# Patient Record
Sex: FEMALE | Race: WHITE | NOT HISPANIC OR LATINO | Employment: OTHER | URBAN - METROPOLITAN AREA
[De-identification: names, ages, dates, MRNs, and addresses within clinical notes are randomized per-mention and may not be internally consistent; named-entity substitution may affect disease eponyms.]

---

## 2017-09-14 ENCOUNTER — OFFICE VISIT (OUTPATIENT)
Dept: MEDICAL GROUP | Facility: MEDICAL CENTER | Age: 77
End: 2017-09-14
Payer: MEDICARE

## 2017-09-14 VITALS
RESPIRATION RATE: 14 BRPM | WEIGHT: 151.01 LBS | TEMPERATURE: 98.1 F | HEART RATE: 89 BPM | OXYGEN SATURATION: 92 % | BODY MASS INDEX: 28.51 KG/M2 | SYSTOLIC BLOOD PRESSURE: 120 MMHG | HEIGHT: 61 IN | DIASTOLIC BLOOD PRESSURE: 78 MMHG

## 2017-09-14 DIAGNOSIS — I25.10 CORONARY ARTERY DISEASE INVOLVING NATIVE CORONARY ARTERY OF NATIVE HEART WITHOUT ANGINA PECTORIS: ICD-10-CM

## 2017-09-14 DIAGNOSIS — E89.0 POSTABLATIVE HYPOTHYROIDISM: ICD-10-CM

## 2017-09-14 DIAGNOSIS — Z23 NEEDS FLU SHOT: ICD-10-CM

## 2017-09-14 DIAGNOSIS — I10 ESSENTIAL HYPERTENSION: ICD-10-CM

## 2017-09-14 DIAGNOSIS — F17.200 TOBACCO USE DISORDER, MILD, ABUSE: ICD-10-CM

## 2017-09-14 DIAGNOSIS — E78.00 HYPERCHOLESTEROLEMIA: ICD-10-CM

## 2017-09-14 DIAGNOSIS — R73.03 PREDIABETES: ICD-10-CM

## 2017-09-14 DIAGNOSIS — Z12.11 SCREEN FOR COLON CANCER: ICD-10-CM

## 2017-09-14 PROCEDURE — 99204 OFFICE O/P NEW MOD 45 MIN: CPT | Mod: 25 | Performed by: FAMILY MEDICINE

## 2017-09-14 PROCEDURE — G0008 ADMIN INFLUENZA VIRUS VAC: HCPCS | Performed by: FAMILY MEDICINE

## 2017-09-14 PROCEDURE — 90662 IIV NO PRSV INCREASED AG IM: CPT | Performed by: FAMILY MEDICINE

## 2017-09-14 RX ORDER — HYDROCHLOROTHIAZIDE 25 MG/1
TABLET ORAL
COMMUNITY
Start: 2017-06-26 | End: 2018-02-23 | Stop reason: SDUPTHER

## 2017-09-14 RX ORDER — ATORVASTATIN CALCIUM 80 MG/1
TABLET, FILM COATED ORAL
COMMUNITY
Start: 2017-08-15 | End: 2018-02-23 | Stop reason: SDUPTHER

## 2017-09-14 RX ORDER — LEVOTHYROXINE SODIUM 100 MCG
TABLET ORAL
COMMUNITY
Start: 2017-06-26 | End: 2018-02-23 | Stop reason: SDUPTHER

## 2017-09-14 RX ORDER — LISINOPRIL 10 MG/1
TABLET ORAL
COMMUNITY
Start: 2017-06-26 | End: 2018-02-23 | Stop reason: SDUPTHER

## 2017-09-14 RX ORDER — ASPIRIN 81 MG/1
81 TABLET, CHEWABLE ORAL DAILY
COMMUNITY

## 2017-09-14 ASSESSMENT — PATIENT HEALTH QUESTIONNAIRE - PHQ9: CLINICAL INTERPRETATION OF PHQ2 SCORE: 0

## 2017-09-14 NOTE — ASSESSMENT & PLAN NOTE
This is a chronic health problem that is well controlled with current medications and lifestyle measures. She has no complaints.  She recently lost 30 lbs due to efforts of her own.

## 2017-09-14 NOTE — ASSESSMENT & PLAN NOTE
This is a chronic health problem that is well controlled with current medications and lifestyle measures. The patient denies chest pain, shortness of breath or dyspnea on exertion.

## 2017-09-14 NOTE — ASSESSMENT & PLAN NOTE
This is a chronic health problem that is well controlled with current medications and lifestyle measures.

## 2017-09-14 NOTE — ASSESSMENT & PLAN NOTE
This is a chronic health problem that is well controlled with current medications and lifestyle measures.  Hasn't had recent blood work.  We will recheck and see her back to go over results.

## 2017-09-14 NOTE — LETTER
[facility]  Ryanne Leos M.D.  [address] [place] [address]  Fax: [phone]   Authorization for Release/Disclosure of   Protected Health Information   Name: KALANI ALMEIDA : 1940 SSN: [id number]   Address:  [address]  [place] [address] Phone:    [phone] (home)    I authorize the entity listed below to release/disclose the PHI below to:   [facility]/Ryanne Leos M.D. and Ryanne Leos M.D.   Provider or Entity Name:  Carlos Carlson MD     Address   City, [facility], [place] [address]   Phone:  [phone]       Fax:     Reason for request: continuity of care   Information to be released:    [  ] LAST COLONOSCOPY,  including any PATH REPORT and follow-up  [  ] LAST FIT/COLOGUARD RESULT [  ] LAST DEXA  [  ] LAST MAMMOGRAM  [  ] LAST PAP  [  ] LAST LABS [  ] RETINA EXAM REPORT  [  ] IMMUNIZATION RECORDS  [ X ] Release all info      [  ] Check here and initial the line next to each item to release ALL health information INCLUDING  _____ Care and treatment for drug and / or alcohol abuse  _____ HIV testing, infection status, or AIDS  _____ Genetic Testing    DATES OF SERVICE OR TIME PERIOD TO BE DISCLOSED: _____________  I understand and acknowledge that:  * This Authorization may be revoked at any time by you in writing, except if your health information has already been used or disclosed.  * Your health information that will be used or disclosed as a result of you signing this authorization could be re-disclosed by the recipient. If this occurs, your re-disclosed health information may no longer be protected by State or Federal laws.  * You may refuse to sign this Authorization. Your refusal will not affect your ability to obtain treatment.  * This Authorization becomes effective upon signing and will  on (date) __________.      If no date is indicated, this Authorization will  one  (1) year from the signature date.    Name: Pam Adame    Signature:   Date:     9/14/2017       PLEASE FAX REQUESTED RECORDS BACK TO: [phone]

## 2017-09-14 NOTE — ASSESSMENT & PLAN NOTE
This is a chronic health problem that is uncontrolled with current lifestyle measures.  This patient smoked a half pack of cigarettes per day from age 24 age 51 or 52. She then quit for nearly 24 years but in the last year her stressors have increased with her  being ill and she smoking about a quarter of a pack per day. She does it when she is out of the home. He does not know that she is smoking.

## 2017-09-14 NOTE — ASSESSMENT & PLAN NOTE
This is a chronic health problem that is well controlled with current medications and lifestyle measures.  She is physically active without chest pain, shortness of breath or dyspnea on exertion. She has done well since her surgery. She does not regularly see a cardiologist. She takes her medications as prescribed.

## 2017-09-14 NOTE — PROGRESS NOTES
CC: To establish care and discuss her chronic health problems listed below.    HISTORY OF THE PRESENT ILLNESS: Patient is a 76 y.o. female. This pleasant patient is here today to establish care and discuss her chronic health problems. Patient needs to get some blood work done and is hoping that after having worked on weight loss of 30 pounds that she may be able to discontinue some of her medications.    Health Maintenance: Patient shows that she has multiple parts of the health maintenance not completed but we need to get her previous records first.      Coronary artery disease involving native coronary artery of native heart without angina pectoris  This is a chronic health problem that is well controlled with current medications and lifestyle measures.  She is physically active without chest pain, shortness of breath or dyspnea on exertion. She has done well since her surgery. She does not regularly see a cardiologist. She takes her medications as prescribed.    Postablative hypothyroidism  This is a chronic health problem that is well controlled with current medications and lifestyle measures. She has no complaints.  She recently lost 30 lbs due to efforts of her own.    Hypercholesterolemia  This is a chronic health problem that is well controlled with current medications and lifestyle measures.  Hasn't had recent blood work.  We will recheck and see her back to go over results.    Tobacco use disorder, mild, abuse  This is a chronic health problem that is uncontrolled with current lifestyle measures.  This patient smoked a half pack of cigarettes per day from age 24 age 51 or 52. She then quit for nearly 24 years but in the last year her stressors have increased with her  being ill and she smoking about a quarter of a pack per day. She does it when she is out of the home. He does not know that she is smoking.    Prediabetes  This is a chronic health problem that is well controlled with current  medications and lifestyle measures.    Essential hypertension  This is a chronic health problem that is well controlled with current medications and lifestyle measures. The patient denies chest pain, shortness of breath or dyspnea on exertion.        Allergies: Review of patient's allergies indicates no known allergies.    Current Outpatient Prescriptions Ordered in [facility]   Medication Sig Dispense Refill   • metformin (GLUCOPHAGE) 1000 MG tablet Take 1,000 mg by mouth 2 times a day, with meals.     • aspirin (ASA) 81 MG Chew Tab chewable tablet Take 81 mg by mouth every day.     • atorvastatin (LIPITOR) 80 MG tablet      • hydrochlorothiazide (HYDRODIURIL) 25 MG Tab      • SYNTHROID 100 MCG Tab      • lisinopril (PRINIVIL) 10 MG Tab        No current Epic-ordered facility-administered medications on file.        Past Medical History:   Diagnosis Date   • Coronary artery disease involving native coronary artery of native heart without angina pectoris 9/14/2017    Done in 1999, Triple bypass   • Postablative hypothyroidism 9/14/2017    Received iodine at age 34 and has been on thyroid replacement since.   • Hypercholesterolemia 9/14/2017   • Tobacco use disorder, mild, abuse 9/14/2017    Smokes 0.25 pack per day   • Prediabetes 9/14/2017    Controlled with Glucophage   • Essential hypertension 9/14/2017       Past Surgical History:   Procedure Laterality Date   • CORONARY ARTERY BYPASS, 3  11/2000   • HYSTERECTOMY, VAGINAL     • TONSILLECTOMY AND ADENOIDECTOMY         Social History   Substance Use Topics   • Smoking status: Current Some Day Smoker     Packs/day: 0.25   • Smokeless tobacco: Never Used   • Alcohol use No       Family History   Problem Relation Age of Onset   • Cancer Mother      lung   • Cancer Father      colon   • Lung Cancer Father    • Heart Disease Sister    • Diabetes Sister        Review of Systems :    ROS:     - Constitutional: Negative for fever, chills, unexpected weight change, and  "fatigue/generalized weakness.     - HEENT: Negative for headaches, vision changes, hearing changes, ear pain, ear discharge, rhinorrhea, sinus congestion, sore throat, and neck pain.      - Respiratory: Negative for cough, sputum production, chest congestion, dyspnea, wheezing, and crackles.      - Cardiovascular: Negative for chest pain, palpitations, orthopnea, and bilateral lower extremity edema.     - Gastrointestinal: Negative for heartburn, nausea, vomiting, abdominal pain, hematochezia, melena, diarrhea, constipation, and greasy/foul-smelling stools.     - Genitourinary: Negative for dysuria, polyuria, hematuria, pyuria, urinary urgency, and urinary incontinence.    - Musculoskeletal: Negative for myalgias, back pain, and joint pain.     - Skin: Negative for rash, itching, cyanotic skin color change.     - Neurological: Negative for dizziness, tingling, tremors, focal sensory deficit, focal weakness and headaches.     - Endo/Heme/Allergies: Does not bruise/bleed easily.     - Psychiatric/Behavioral: Negative for depression, suicidal/homicidal ideation and memory loss.        - NOTE: All other systems reviewed and are negative, except as in HPI.      .      Exam: Blood pressure 120/78, pulse 89, temperature 36.7 °C (98.1 °F), resp. rate 14, height 1.549 m (5' 1\"), weight 68.5 kg (151 lb 0.2 oz), SpO2 92 %, not currently breastfeeding.    General: Normal appearing. No distress.  HEENT: Normocephalic. Eyes conjunctiva clear lids without ptosis, pupils equal and reactive to light accommodation, ears normal shape and contour, canals are clear bilaterally, tympanic membranes are benign, nasal mucosa benign, oropharynx is without erythema, edema or exudates.   Neck: Supple without JVD or bruit. Thyroid is not enlarged.  Pulmonary: Clear to ausculation.  Normal effort. No rales, ronchi, or wheezing.  Cardiovascular: Regular rate and rhythm without murmur. Carotid and radial pulses are intact and equal " bilaterally.  Abdomen: Soft, nontender, nondistended. Normal bowel sounds. Liver and spleen are not palpable  Neurologic: Grossly nonfocal  Lymph: No cervical, supraclavicular or axillary lymph nodes are palpable  Skin: Warm and dry.  No obvious lesions.  Musculoskeletal: Normal gait. No extremity cyanosis, clubbing, or edema.  Psych: Normal mood and affect. Alert and oriented x3. Judgment and insight is normal.    Please note that this dictation was created using voice recognition software. I have made every reasonable attempt to correct obvious errors, but I expect that there are errors of grammar and possibly content that I did not discover before finalizing the note.      Assessment/Plan  1. Needs flu shot  Given today  - INFLUENZA VACCINE, HIGH DOSE (65+ ONLY)    2. Coronary artery disease involving native coronary artery of native heart without angina pectoris  Controlled, continue with current meds and lifestyle.      3. Postablative hypothyroidism  Controlled, continue with current meds and lifestyle.    - TSH; Future  - FREE THYROXINE; Future    4. Hypercholesterolemia  Controlled, continue with current meds and lifestyle.    - COMP METABOLIC PANEL; Future  - LIPID PROFILE; Future    5. Tobacco use disorder, mild, abuse  Uncontrolled the patient working on trying to quit once again.    6. Prediabetes  Unknown control, patient's upping her 30 pound weight loss will allow her to go off of her metformin.  - HEMOGLOBIN A1C; Future    7. Essential hypertension  Controlled, continue with current meds and lifestyle.      8. Screen for colon cancer  Patient will do a fit tests she absolutely refuses to consider colonoscopy  - OCCULT BLOOD FECES IMMUNOASSAY; Future

## 2017-09-15 ENCOUNTER — HOSPITAL ENCOUNTER (OUTPATIENT)
Dept: LAB | Facility: MEDICAL CENTER | Age: 77
End: 2017-09-15
Attending: FAMILY MEDICINE
Payer: MEDICARE

## 2017-09-15 DIAGNOSIS — R73.03 PREDIABETES: ICD-10-CM

## 2017-09-15 DIAGNOSIS — E89.0 POSTABLATIVE HYPOTHYROIDISM: ICD-10-CM

## 2017-09-15 DIAGNOSIS — E78.00 HYPERCHOLESTEROLEMIA: ICD-10-CM

## 2017-09-15 LAB
ALBUMIN SERPL BCP-MCNC: 4 G/DL (ref 3.2–4.9)
ALBUMIN/GLOB SERPL: 1.5 G/DL
ALP SERPL-CCNC: 101 U/L (ref 30–99)
ALT SERPL-CCNC: 15 U/L (ref 2–50)
ANION GAP SERPL CALC-SCNC: 9 MMOL/L (ref 0–11.9)
AST SERPL-CCNC: 17 U/L (ref 12–45)
BILIRUB SERPL-MCNC: 0.8 MG/DL (ref 0.1–1.5)
BUN SERPL-MCNC: 16 MG/DL (ref 8–22)
CALCIUM SERPL-MCNC: 9.5 MG/DL (ref 8.5–10.5)
CHLORIDE SERPL-SCNC: 103 MMOL/L (ref 96–112)
CHOLEST SERPL-MCNC: 138 MG/DL (ref 100–199)
CO2 SERPL-SCNC: 24 MMOL/L (ref 20–33)
CREAT SERPL-MCNC: 0.69 MG/DL (ref 0.5–1.4)
EST. AVERAGE GLUCOSE BLD GHB EST-MCNC: 143 MG/DL
GFR SERPL CREATININE-BSD FRML MDRD: >60 ML/MIN/1.73 M 2
GLOBULIN SER CALC-MCNC: 2.6 G/DL (ref 1.9–3.5)
GLUCOSE SERPL-MCNC: 109 MG/DL (ref 65–99)
HBA1C MFR BLD: 6.6 % (ref 0–5.6)
HDLC SERPL-MCNC: 45 MG/DL
LDLC SERPL CALC-MCNC: 68 MG/DL
POTASSIUM SERPL-SCNC: 4.4 MMOL/L (ref 3.6–5.5)
PROT SERPL-MCNC: 6.6 G/DL (ref 6–8.2)
SODIUM SERPL-SCNC: 136 MMOL/L (ref 135–145)
T4 FREE SERPL-MCNC: 1.24 NG/DL (ref 0.53–1.43)
TRIGL SERPL-MCNC: 125 MG/DL (ref 0–149)
TSH SERPL DL<=0.005 MIU/L-ACNC: 0.4 UIU/ML (ref 0.3–3.7)

## 2017-09-15 PROCEDURE — 80053 COMPREHEN METABOLIC PANEL: CPT

## 2017-09-15 PROCEDURE — 36415 COLL VENOUS BLD VENIPUNCTURE: CPT

## 2017-09-15 PROCEDURE — 84443 ASSAY THYROID STIM HORMONE: CPT

## 2017-09-15 PROCEDURE — 80061 LIPID PANEL: CPT

## 2017-09-15 PROCEDURE — 83036 HEMOGLOBIN GLYCOSYLATED A1C: CPT | Mod: GA

## 2017-09-15 PROCEDURE — 84439 ASSAY OF FREE THYROXINE: CPT

## 2017-09-18 ENCOUNTER — HOSPITAL ENCOUNTER (OUTPATIENT)
Facility: MEDICAL CENTER | Age: 77
End: 2017-09-18
Attending: FAMILY MEDICINE
Payer: MEDICARE

## 2017-09-18 PROCEDURE — 82274 ASSAY TEST FOR BLOOD FECAL: CPT

## 2017-09-22 DIAGNOSIS — Z12.11 SCREEN FOR COLON CANCER: ICD-10-CM

## 2017-09-22 LAB — HEMOCCULT STL QL IA: NEGATIVE

## 2017-10-05 ENCOUNTER — OFFICE VISIT (OUTPATIENT)
Dept: MEDICAL GROUP | Facility: MEDICAL CENTER | Age: 77
End: 2017-10-05
Payer: MEDICARE

## 2017-10-05 VITALS
OXYGEN SATURATION: 98 % | HEIGHT: 61 IN | RESPIRATION RATE: 14 BRPM | SYSTOLIC BLOOD PRESSURE: 120 MMHG | TEMPERATURE: 98.2 F | DIASTOLIC BLOOD PRESSURE: 87 MMHG | BODY MASS INDEX: 28.51 KG/M2 | HEART RATE: 61 BPM | WEIGHT: 151.01 LBS

## 2017-10-05 DIAGNOSIS — I10 ESSENTIAL HYPERTENSION: ICD-10-CM

## 2017-10-05 DIAGNOSIS — I25.10 CORONARY ARTERY DISEASE INVOLVING NATIVE CORONARY ARTERY OF NATIVE HEART WITHOUT ANGINA PECTORIS: ICD-10-CM

## 2017-10-05 DIAGNOSIS — R73.03 PREDIABETES: ICD-10-CM

## 2017-10-05 DIAGNOSIS — E78.00 HYPERCHOLESTEROLEMIA: ICD-10-CM

## 2017-10-05 PROCEDURE — 99214 OFFICE O/P EST MOD 30 MIN: CPT | Performed by: FAMILY MEDICINE

## 2017-10-05 NOTE — PROGRESS NOTES
CC: Follow-up on chronic health problems    HISTORY OF PRESENT ILLNESS: Patient is a 76 y.o. female established patient who presents today to follow-up on blood work and chronic health problems listed below.    Health Maintenance: Completed-patient is due for mammogram in November. She is going to send me that date. She did a fit test which was negative.    Prediabetes  This is a chronic health problem that is uncontrolled with current medications and lifestyle measures.  Her fasting glucose is good at 109 but her A1c has gone up to 6.6. She is under a great deal of stress and this is contributing to the elevation in the glucose    Coronary artery disease involving native coronary artery of native heart without angina pectoris  This is a chronic health problem that is well controlled with current medications and lifestyle measures.   Patient states that she is doing well. She has not reestablished with a cardiologist. If she needs one in the future she would like to see Dr. Patino, who is seeing her .    Essential hypertension  This is a chronic health problem that is well controlled with current medications and lifestyle measures.   The patient denies chest pain, shortness of breath or dyspnea on exertion.  Patient's blood pressure is excellent 120/87. She states that she's been under increased stress but it appears that she is handling this well.    Hypercholesterolemia  This is a chronic health problem that is well controlled with current medications and lifestyle measures.   Patient's blood work comes back showing that her total cholesterol is 138, took a straight 125, HDL 45 and her LDL is 68. All of these are at goal for a patient with known heart disease. We will continue to monitor every 6 months.      Patient Active Problem List    Diagnosis Date Noted   • Coronary artery disease involving native coronary artery of native heart without angina pectoris 09/14/2017   • Postablative hypothyroidism  09/14/2017   • Hypercholesterolemia 09/14/2017   • Tobacco use disorder, mild, abuse 09/14/2017   • Prediabetes 09/14/2017   • Essential hypertension 09/14/2017      Allergies:Review of patient's allergies indicates no known allergies.    Current Outpatient Prescriptions   Medication Sig Dispense Refill   • atorvastatin (LIPITOR) 80 MG tablet      • hydrochlorothiazide (HYDRODIURIL) 25 MG Tab      • SYNTHROID 100 MCG Tab      • lisinopril (PRINIVIL) 10 MG Tab      • metformin (GLUCOPHAGE) 1000 MG tablet Take 1,000 mg by mouth 2 times a day, with meals.     • aspirin (ASA) 81 MG Chew Tab chewable tablet Take 81 mg by mouth every day.       No current facility-administered medications for this visit.        Social History   Substance Use Topics   • Smoking status: Current Some Day Smoker     Packs/day: 0.25   • Smokeless tobacco: Never Used   • Alcohol use No     Social History     Social History Narrative   • No narrative on file       Family History   Problem Relation Age of Onset   • Cancer Mother      lung   • Cancer Father      colon   • Lung Cancer Father    • Heart Disease Sister    • Diabetes Sister        Review of Systems:      - Constitutional: Negative for fever, chills, unexpected weight change, and fatigue/generalized weakness.     - HEENT: Negative for headaches, vision changes, hearing changes, ear pain, ear discharge, rhinorrhea, sinus congestion, sore throat, and neck pain.      - Respiratory: Negative for cough, sputum production, chest congestion, dyspnea, wheezing, and crackles.      - Cardiovascular: Negative for chest pain, palpitations, orthopnea, and bilateral lower extremity edema.     - Gastrointestinal: Negative for heartburn, nausea, vomiting, abdominal pain, hematochezia, melena, diarrhea, constipation, and greasy/foul-smelling stools.     - Genitourinary: Negative for dysuria, polyuria, hematuria, pyuria, urinary urgency, and urinary incontinence.    - Musculoskeletal: Negative for  "myalgias, back pain, and joint pain.     - Skin: Negative for rash, itching, cyanotic skin color change.     - Neurological: Negative for dizziness, tingling, tremors, focal sensory deficit, focal weakness and headaches.     - Endo/Heme/Allergies: Does not bruise/bleed easily.     - Psychiatric/Behavioral: Negative for depression, suicidal/homicidal ideation and memory loss.        - NOTE: All other systems reviewed and are negative, except as in HPI.      Exam:    Blood pressure 120/87, pulse 61, temperature 36.8 °C (98.2 °F), resp. rate 14, height 1.549 m (5' 1\"), weight 68.5 kg (151 lb 0.2 oz), SpO2 98 %, not currently breastfeeding. Body mass index is 28.53 kg/m².    General:  Well nourished, well developed female in NAD  Head is grossly normal.  LABS: 9/15/17: Results reviewed and discussed with the patient, questions answered.  Patient was seen for 25 minutes face to face of which, 20 minutes was spent counseling regarding the above mentioned problems.  Please note that this dictation was created using voice recognition software. I have made every reasonable attempt to correct obvious errors, but I expect that there are errors of grammar and possibly content that I did not discover before finalizing the note.    Assessment/Plan:  1. Prediabetes  Borderline Controlled, continue with current meds and lifestyle. We will recheck a company and metabolic panel in 3 months. I do not want to repeat her A1c because her insurance will not cover this. She is going to work on lifestyle management.    - COMP METABOLIC PANEL; Future    2. Coronary artery disease involving native coronary artery of native heart without angina pectoris  Controlled, continue with current meds and lifestyle.      3. Essential hypertension  Controlled, continue with current meds and lifestyle.      4. Hypercholesterolemia  Controlled, continue with current meds and lifestyle.           "

## 2017-10-05 NOTE — ASSESSMENT & PLAN NOTE
This is a chronic health problem that is uncontrolled with current medications and lifestyle measures.  Her fasting glucose is good at 109 but her A1c has gone up to 6.6. She is under a great deal of stress and this is contributing to the elevation in the glucose

## 2017-10-05 NOTE — ASSESSMENT & PLAN NOTE
This is a chronic health problem that is well controlled with current medications and lifestyle measures.   Patient's blood work comes back showing that her total cholesterol is 138, took a straight 125, HDL 45 and her LDL is 68. All of these are at goal for a patient with known heart disease. We will continue to monitor every 6 months.

## 2017-10-05 NOTE — ASSESSMENT & PLAN NOTE
This is a chronic health problem that is well controlled with current medications and lifestyle measures.   Patient states that she is doing well. She has not reestablished with a cardiologist. If she needs one in the future she would like to see Dr. Patino, who is seeing her .

## 2017-10-05 NOTE — ASSESSMENT & PLAN NOTE
This is a chronic health problem that is well controlled with current medications and lifestyle measures.   The patient denies chest pain, shortness of breath or dyspnea on exertion.  Patient's blood pressure is excellent 120/87. She states that she's been under increased stress but it appears that she is handling this well.

## 2017-10-13 LAB
ALBUMIN SERPL-MCNC: 4.3 G/DL (ref 3.5–4.8)
ALBUMIN/GLOB SERPL: 1.7 {RATIO} (ref 1.2–2.2)
ALP SERPL-CCNC: 114 IU/L (ref 39–117)
ALT SERPL-CCNC: 18 IU/L (ref 0–32)
AST SERPL-CCNC: 20 IU/L (ref 0–40)
BILIRUB SERPL-MCNC: 0.5 MG/DL (ref 0–1.2)
BUN SERPL-MCNC: 17 MG/DL (ref 8–27)
BUN/CREAT SERPL: 23 (ref 12–28)
CALCIUM SERPL-MCNC: 9.9 MG/DL (ref 8.7–10.3)
CHLORIDE SERPL-SCNC: 98 MMOL/L (ref 96–106)
CO2 SERPL-SCNC: 24 MMOL/L (ref 18–29)
CREAT SERPL-MCNC: 0.74 MG/DL (ref 0.57–1)
GLOBULIN SER CALC-MCNC: 2.5 G/DL (ref 1.5–4.5)
GLUCOSE SERPL-MCNC: 132 MG/DL (ref 65–99)
POTASSIUM SERPL-SCNC: 4.8 MMOL/L (ref 3.5–5.2)
PROT SERPL-MCNC: 6.8 G/DL (ref 6–8.5)
SODIUM SERPL-SCNC: 139 MMOL/L (ref 134–144)

## 2017-10-24 DIAGNOSIS — E78.00 HYPERCHOLESTEROLEMIA: ICD-10-CM

## 2017-10-24 DIAGNOSIS — R73.03 PREDIABETES: ICD-10-CM

## 2017-10-24 NOTE — PROGRESS NOTES
Patient with history of hyperglycemia. Most recent blood test done one month after a reading of 109 is now elevated at 132. Patient going to be re-seen in January. She is working on lifestyle management. We will recheck her levels prior to her return.

## 2018-01-05 ENCOUNTER — OFFICE VISIT (OUTPATIENT)
Dept: MEDICAL GROUP | Facility: MEDICAL CENTER | Age: 78
End: 2018-01-05
Payer: MEDICARE

## 2018-01-05 VITALS
HEIGHT: 61 IN | RESPIRATION RATE: 16 BRPM | DIASTOLIC BLOOD PRESSURE: 78 MMHG | OXYGEN SATURATION: 97 % | TEMPERATURE: 98.2 F | HEART RATE: 86 BPM | SYSTOLIC BLOOD PRESSURE: 120 MMHG | WEIGHT: 150 LBS | BODY MASS INDEX: 28.32 KG/M2

## 2018-01-05 DIAGNOSIS — Z12.31 SCREENING MAMMOGRAM, ENCOUNTER FOR: ICD-10-CM

## 2018-01-05 NOTE — PROGRESS NOTES
After talking with this patient, we decided to cancel this appointment and see her back later in the month when she has time to get her blood work done. Patient does need a mammogram ordered. She will get her blood work done and we will schedule her and her  back. Were also canceling his appointment.

## 2018-01-08 ENCOUNTER — HOSPITAL ENCOUNTER (OUTPATIENT)
Dept: LAB | Facility: MEDICAL CENTER | Age: 78
End: 2018-01-08
Attending: FAMILY MEDICINE
Payer: MEDICARE

## 2018-01-08 DIAGNOSIS — E78.00 HYPERCHOLESTEROLEMIA: ICD-10-CM

## 2018-01-08 DIAGNOSIS — R73.03 PREDIABETES: ICD-10-CM

## 2018-01-08 LAB
ALBUMIN SERPL BCP-MCNC: 4.2 G/DL (ref 3.2–4.9)
ALBUMIN/GLOB SERPL: 1.7 G/DL
ALP SERPL-CCNC: 86 U/L (ref 30–99)
ALT SERPL-CCNC: 19 U/L (ref 2–50)
ANION GAP SERPL CALC-SCNC: 9 MMOL/L (ref 0–11.9)
AST SERPL-CCNC: 19 U/L (ref 12–45)
BILIRUB SERPL-MCNC: 0.5 MG/DL (ref 0.1–1.5)
BUN SERPL-MCNC: 22 MG/DL (ref 8–22)
CALCIUM SERPL-MCNC: 9.2 MG/DL (ref 8.5–10.5)
CHLORIDE SERPL-SCNC: 100 MMOL/L (ref 96–112)
CHOLEST SERPL-MCNC: 143 MG/DL (ref 100–199)
CO2 SERPL-SCNC: 25 MMOL/L (ref 20–33)
CREAT SERPL-MCNC: 0.81 MG/DL (ref 0.5–1.4)
EST. AVERAGE GLUCOSE BLD GHB EST-MCNC: 128 MG/DL
GLOBULIN SER CALC-MCNC: 2.5 G/DL (ref 1.9–3.5)
GLUCOSE SERPL-MCNC: 119 MG/DL (ref 65–99)
HBA1C MFR BLD: 6.1 % (ref 0–5.6)
HDLC SERPL-MCNC: 48 MG/DL
LDLC SERPL CALC-MCNC: 74 MG/DL
POTASSIUM SERPL-SCNC: 4 MMOL/L (ref 3.6–5.5)
PROT SERPL-MCNC: 6.7 G/DL (ref 6–8.2)
SODIUM SERPL-SCNC: 134 MMOL/L (ref 135–145)
TRIGL SERPL-MCNC: 107 MG/DL (ref 0–149)

## 2018-01-08 PROCEDURE — 36415 COLL VENOUS BLD VENIPUNCTURE: CPT

## 2018-01-08 PROCEDURE — 80061 LIPID PANEL: CPT

## 2018-01-08 PROCEDURE — 80053 COMPREHEN METABOLIC PANEL: CPT

## 2018-01-08 PROCEDURE — 83036 HEMOGLOBIN GLYCOSYLATED A1C: CPT

## 2018-01-15 ENCOUNTER — HOSPITAL ENCOUNTER (OUTPATIENT)
Dept: RADIOLOGY | Facility: MEDICAL CENTER | Age: 78
End: 2018-01-15
Attending: FAMILY MEDICINE
Payer: MEDICARE

## 2018-01-15 DIAGNOSIS — Z12.31 SCREENING MAMMOGRAM, ENCOUNTER FOR: ICD-10-CM

## 2018-01-15 PROCEDURE — 77067 SCR MAMMO BI INCL CAD: CPT

## 2018-01-19 ENCOUNTER — OFFICE VISIT (OUTPATIENT)
Dept: MEDICAL GROUP | Facility: MEDICAL CENTER | Age: 78
End: 2018-01-19
Payer: MEDICARE

## 2018-01-19 VITALS
HEIGHT: 61 IN | TEMPERATURE: 97 F | RESPIRATION RATE: 16 BRPM | SYSTOLIC BLOOD PRESSURE: 122 MMHG | HEART RATE: 78 BPM | OXYGEN SATURATION: 95 % | DIASTOLIC BLOOD PRESSURE: 74 MMHG | WEIGHT: 151.4 LBS | BODY MASS INDEX: 28.58 KG/M2

## 2018-01-19 DIAGNOSIS — R73.03 PREDIABETES: ICD-10-CM

## 2018-01-19 DIAGNOSIS — I10 ESSENTIAL HYPERTENSION: ICD-10-CM

## 2018-01-19 DIAGNOSIS — E78.00 HYPERCHOLESTEROLEMIA: ICD-10-CM

## 2018-01-19 PROCEDURE — 99214 OFFICE O/P EST MOD 30 MIN: CPT | Performed by: FAMILY MEDICINE

## 2018-01-19 NOTE — ASSESSMENT & PLAN NOTE
This is a chronic health problem that is well controlled with current medications and lifestyle measures.  She has done a great job on her eating.  Her glucose is down to 119 and her A1c is down to 6.1.

## 2018-01-19 NOTE — ASSESSMENT & PLAN NOTE
This is a chronic health problem that is well controlled with current medications and lifestyle measures.  Her total cholesterolk is down to 143, triglycerides are down to 107, HDL is good at 48 and LDL is down to 74.  She will continue with current meds.

## 2018-01-19 NOTE — PROGRESS NOTES
CC: Follow-up on chronic health problems    HISTORY OF PRESENT ILLNESS: Patient is a 77 y.o. female established patient who presents today to follow-up on her chronic health problems as outlined below. This patient is accompanied by her  Chivo for whom she is the primary care provider as he is developing dementia as well as multiple other chronic health problems. Patient seems to be handling the stress fairly well.    Health Maintenance: Completed    Essential hypertension  This is a chronic health problem that is well controlled with current medications and lifestyle measures.  The patient denies chest pain, shortness of breath or dyspnea on exertion.      Prediabetes  This is a chronic health problem that is well controlled with current medications and lifestyle measures.  She has done a great job on her eating.  Her glucose is down to 119 and her A1c is down to 6.1.    Hypercholesterolemia  This is a chronic health problem that is well controlled with current medications and lifestyle measures.  Her total cholesterolk is down to 143, triglycerides are down to 107, HDL is good at 48 and LDL is down to 74.  She will continue with current meds.      Patient Active Problem List    Diagnosis Date Noted   • Coronary artery disease involving native coronary artery of native heart without angina pectoris 09/14/2017   • Postablative hypothyroidism 09/14/2017   • Hypercholesterolemia 09/14/2017   • Tobacco use disorder, mild, abuse 09/14/2017   • Prediabetes 09/14/2017   • Essential hypertension 09/14/2017      Allergies:Patient has no known allergies.    Current Outpatient Prescriptions   Medication Sig Dispense Refill   • atorvastatin (LIPITOR) 80 MG tablet      • hydrochlorothiazide (HYDRODIURIL) 25 MG Tab      • SYNTHROID 100 MCG Tab      • lisinopril (PRINIVIL) 10 MG Tab      • metformin (GLUCOPHAGE) 1000 MG tablet Take 1,000 mg by mouth 2 times a day, with meals.     • aspirin (ASA) 81 MG Chew Tab chewable  "tablet Take 81 mg by mouth every day.       No current facility-administered medications for this visit.        Social History   Substance Use Topics   • Smoking status: Current Some Day Smoker     Packs/day: 0.25   • Smokeless tobacco: Never Used   • Alcohol use No     Social History     Social History Narrative   • No narrative on file       Family History   Problem Relation Age of Onset   • Cancer Mother      lung   • Cancer Father      colon   • Lung Cancer Father    • Heart Disease Sister    • Diabetes Sister        Review of Systems:      - Constitutional: Negative for fever, chills, unexpected weight change, and fatigue/generalized weakness.     - HEENT: Negative for headaches, vision changes, hearing changes, ear pain, ear discharge, rhinorrhea, sinus congestion, sore throat, and neck pain.      - Respiratory: Negative for cough, sputum production, chest congestion, dyspnea, wheezing, and crackles.      - Cardiovascular: Negative for chest pain, palpitations, orthopnea, and bilateral lower extremity edema.     - Gastrointestinal: Negative for heartburn, nausea, vomiting, abdominal pain, hematochezia, melena, diarrhea, constipation, and greasy/foul-smelling stools.     - Genitourinary: Negative for dysuria, polyuria, hematuria, pyuria, urinary urgency, and urinary incontinence.    - Musculoskeletal: Negative for myalgias, back pain, and joint pain.     - Skin: Negative for rash, itching, cyanotic skin color change.     - Neurological: Negative for dizziness, tingling, tremors, focal sensory deficit, focal weakness and headaches.     - Endo/Heme/Allergies: Does not bruise/bleed easily.     - Psychiatric/Behavioral: Negative for depression, suicidal/homicidal ideation and memory loss.        - NOTE: All other systems reviewed and are negative, except as in HPI.    Exam:    Blood pressure 122/74, pulse 78, temperature 36.1 °C (97 °F), resp. rate 16, height 1.549 m (5' 1\"), weight 68.7 kg (151 lb 6.4 oz), SpO2 95 " %. Body mass index is 28.61 kg/m².    General:  Well nourished, well developed female in NAD  LABS: 1/8/18: Results reviewed and discussed with the patient, questions answered.    Patient was seen for 25 minutes face to face of which, 20 minutes was spent counseling regarding the above mentioned problems.  Assessment/Plan:  1. Essential hypertension  Controlled, continue with current meds and lifestyle.      2. Prediabetes  Controlled, continue with current meds and lifestyle.      3. Hypercholesterolemia  Controlled, continue with current meds and lifestyle.    Patient is doing so well I think we can wait 6 months before we see her back. She'll let me know if she starts having problems before then.

## 2018-02-12 ENCOUNTER — HOSPITAL ENCOUNTER (OUTPATIENT)
Dept: RADIOLOGY | Facility: MEDICAL CENTER | Age: 78
End: 2018-02-12

## 2018-02-23 ENCOUNTER — PATIENT MESSAGE (OUTPATIENT)
Dept: MEDICAL GROUP | Facility: MEDICAL CENTER | Age: 78
End: 2018-02-23

## 2018-02-23 RX ORDER — LISINOPRIL 10 MG/1
10 TABLET ORAL DAILY
Qty: 90 TAB | Refills: 3 | Status: SHIPPED | OUTPATIENT
Start: 2018-02-23 | End: 2019-03-25 | Stop reason: SDUPTHER

## 2018-02-23 RX ORDER — HYDROCHLOROTHIAZIDE 25 MG/1
25 TABLET ORAL DAILY
Qty: 90 TAB | Refills: 3 | Status: SHIPPED | OUTPATIENT
Start: 2018-02-23 | End: 2019-03-25 | Stop reason: SDUPTHER

## 2018-02-23 RX ORDER — ATORVASTATIN CALCIUM 80 MG/1
80 TABLET, FILM COATED ORAL DAILY
Qty: 90 TAB | Refills: 3 | Status: SHIPPED | OUTPATIENT
Start: 2018-02-23 | End: 2019-03-25 | Stop reason: SDUPTHER

## 2018-02-23 RX ORDER — LEVOTHYROXINE SODIUM 100 MCG
100 TABLET ORAL
Qty: 90 TAB | Refills: 3 | Status: SHIPPED | OUTPATIENT
Start: 2018-02-23 | End: 2018-03-05

## 2018-03-05 ENCOUNTER — PATIENT MESSAGE (OUTPATIENT)
Dept: MEDICAL GROUP | Facility: MEDICAL CENTER | Age: 78
End: 2018-03-05

## 2018-03-05 DIAGNOSIS — E89.0 POSTABLATIVE HYPOTHYROIDISM: ICD-10-CM

## 2018-03-05 RX ORDER — LEVOTHYROXINE SODIUM 0.1 MG/1
100 TABLET ORAL
Qty: 90 TAB | Refills: 3 | Status: SHIPPED | OUTPATIENT
Start: 2018-03-05 | End: 2019-10-21 | Stop reason: SDUPTHER

## 2018-03-06 NOTE — TELEPHONE ENCOUNTER
"From: Pam Adame  To: Ryanne Leos M.D.  Sent: 3/5/2018 4:18 PM PST  Subject: Prescription Question    I have been taking the generic brand for years. Had my thyroid removed via radioactive iodine in 1973.  The med was sent on a one time basis--I was charged $98 for pills for 90 days. Will not need a refill for 90 days, thank you --sorry I did not make myself clear in original message. I am in hopes they will keep the new prescription you sent and send it out when I need more pills.  Pam  ----- Message -----  From: Ryanne Leos M.D.  Sent: 3/5/2018 3:26 PM PST  To: Pam Adame  Subject: RE: Prescription Question  Ridge Orozco,  I will change that right now. I am sorry because not everyone absorbs L-Thyroxine and ever tablet can be a bit different. I will send the rx and keep me informed if you notice any difference in how you feel.  Take care, Dr. Pearl      ----- Message -----   From: Pam Adame   Sent: 3/5/2018 8:18 AM PST   To: Ryanne Leos M.D.  Subject: Prescription Question    Dr. Leos, My [facility] does not cover my medication \"Synthroid tabs\" but they do for generic brand L-Thyroxine. Would you please change my prescription to show the generic brand the next time this medication is ordered. Thank you, have a wonderful day. Pam  "

## 2018-04-19 DIAGNOSIS — R73.03 PREDIABETES: ICD-10-CM

## 2018-04-19 DIAGNOSIS — E78.00 HYPERCHOLESTEROLEMIA: ICD-10-CM

## 2018-07-12 ENCOUNTER — HOSPITAL ENCOUNTER (OUTPATIENT)
Dept: LAB | Facility: MEDICAL CENTER | Age: 78
End: 2018-07-12
Attending: FAMILY MEDICINE
Payer: MEDICARE

## 2018-07-12 DIAGNOSIS — R73.03 PREDIABETES: ICD-10-CM

## 2018-07-12 DIAGNOSIS — E78.00 HYPERCHOLESTEROLEMIA: ICD-10-CM

## 2018-07-12 LAB
ALBUMIN SERPL BCP-MCNC: 4.1 G/DL (ref 3.2–4.9)
ALBUMIN/GLOB SERPL: 1.6 G/DL
ALP SERPL-CCNC: 76 U/L (ref 30–99)
ALT SERPL-CCNC: 17 U/L (ref 2–50)
ANION GAP SERPL CALC-SCNC: 8 MMOL/L (ref 0–11.9)
AST SERPL-CCNC: 16 U/L (ref 12–45)
BILIRUB SERPL-MCNC: 0.6 MG/DL (ref 0.1–1.5)
BUN SERPL-MCNC: 23 MG/DL (ref 8–22)
CALCIUM SERPL-MCNC: 9.3 MG/DL (ref 8.5–10.5)
CHLORIDE SERPL-SCNC: 103 MMOL/L (ref 96–112)
CHOLEST SERPL-MCNC: 138 MG/DL (ref 100–199)
CO2 SERPL-SCNC: 26 MMOL/L (ref 20–33)
CREAT SERPL-MCNC: 0.76 MG/DL (ref 0.5–1.4)
EST. AVERAGE GLUCOSE BLD GHB EST-MCNC: 134 MG/DL
GLOBULIN SER CALC-MCNC: 2.5 G/DL (ref 1.9–3.5)
GLUCOSE SERPL-MCNC: 109 MG/DL (ref 65–99)
HBA1C MFR BLD: 6.3 % (ref 0–5.6)
HDLC SERPL-MCNC: 47 MG/DL
LDLC SERPL CALC-MCNC: 71 MG/DL
POTASSIUM SERPL-SCNC: 4.4 MMOL/L (ref 3.6–5.5)
PROT SERPL-MCNC: 6.6 G/DL (ref 6–8.2)
SODIUM SERPL-SCNC: 137 MMOL/L (ref 135–145)
TRIGL SERPL-MCNC: 102 MG/DL (ref 0–149)

## 2018-07-12 PROCEDURE — 80061 LIPID PANEL: CPT

## 2018-07-12 PROCEDURE — 83036 HEMOGLOBIN GLYCOSYLATED A1C: CPT | Mod: GA

## 2018-07-12 PROCEDURE — 80053 COMPREHEN METABOLIC PANEL: CPT

## 2018-07-12 PROCEDURE — 36415 COLL VENOUS BLD VENIPUNCTURE: CPT

## 2018-07-20 ENCOUNTER — OFFICE VISIT (OUTPATIENT)
Dept: MEDICAL GROUP | Facility: MEDICAL CENTER | Age: 78
End: 2018-07-20
Payer: MEDICARE

## 2018-07-20 VITALS
BODY MASS INDEX: 28.28 KG/M2 | SYSTOLIC BLOOD PRESSURE: 130 MMHG | OXYGEN SATURATION: 95 % | TEMPERATURE: 98.3 F | DIASTOLIC BLOOD PRESSURE: 52 MMHG | HEART RATE: 75 BPM | HEIGHT: 61 IN | WEIGHT: 149.8 LBS

## 2018-07-20 DIAGNOSIS — R73.03 PREDIABETES: ICD-10-CM

## 2018-07-20 DIAGNOSIS — E78.00 HYPERCHOLESTEROLEMIA: ICD-10-CM

## 2018-07-20 DIAGNOSIS — I10 ESSENTIAL HYPERTENSION: ICD-10-CM

## 2018-07-20 PROCEDURE — 99214 OFFICE O/P EST MOD 30 MIN: CPT | Performed by: FAMILY MEDICINE

## 2018-07-20 NOTE — ASSESSMENT & PLAN NOTE
This is a chronic health problem for this patient that has shown some improvement.  Her fasting glucose is down to 109 but her A1c continued to be elevated at 6.3.  She is not high enough to consider changes in medications.  She actually overall is doing well.  Her cholesterol panel has shown good improvement and we will have her continue with current meds.

## 2018-07-20 NOTE — PROGRESS NOTES
CC:Diagnoses of Prediabetes, Hypercholesterolemia, and Essential hypertension were pertinent to this visit.      HISTORY OF PRESENT ILLNESS: Patient is a 77 y.o. female established patient who presents today to  follow up on chronic problems as outlined below.      Health Maintenance: Completed    Prediabetes  This is a chronic health problem for this patient that has shown some improvement.  Her fasting glucose is down to 109 but her A1c continued to be elevated at 6.3.  She is not high enough to consider changes in medications.  She actually overall is doing well.  Her cholesterol panel has shown good improvement and we will have her continue with current meds.    Hypercholesterolemia  This is a chronic health problem for this patient for which she is on Lipitor at 80 mg on the generic.  Her total cholesterol is 138, triglycerides 102, HDL 47 and her LDL is great at 71.  We will have her continue with current meds.  There is no liver dysfunction.    Essential hypertension  This is a chronic health problem that is well controlled with current medications and lifestyle measures. The patient denies chest pain, shortness of breath or dyspnea on exertion.        Patient Active Problem List    Diagnosis Date Noted   • Coronary artery disease involving native coronary artery of native heart without angina pectoris 09/14/2017   • Postablative hypothyroidism 09/14/2017   • Hypercholesterolemia 09/14/2017   • Tobacco use disorder, mild, abuse 09/14/2017   • Prediabetes 09/14/2017   • Essential hypertension 09/14/2017      Allergies:Patient has no known allergies.    Current Outpatient Prescriptions   Medication Sig Dispense Refill   • levothyroxine (SYNTHROID) 100 MCG Tab Take 1 Tab by mouth Every morning on an empty stomach. 90 Tab 3   • atorvastatin (LIPITOR) 80 MG tablet Take 1 Tab by mouth every day. 90 Tab 3   • hydroCHLOROthiazide (HYDRODIURIL) 25 MG Tab Take 1 Tab by mouth every day. 90 Tab 3   • lisinopril  (PRINIVIL) 10 MG Tab Take 1 Tab by mouth every day. 90 Tab 3   • metformin (GLUCOPHAGE) 1000 MG tablet Take 1 Tab by mouth 2 times a day, with meals. 180 Tab 3   • aspirin (ASA) 81 MG Chew Tab chewable tablet Take 81 mg by mouth every day.       No current facility-administered medications for this visit.        Social History   Substance Use Topics   • Smoking status: Current Some Day Smoker     Packs/day: 0.25   • Smokeless tobacco: Never Used   • Alcohol use No     Social History     Social History Narrative   • No narrative on file       Family History   Problem Relation Age of Onset   • Cancer Mother      lung   • Cancer Father      colon   • Lung Cancer Father    • Heart Disease Sister    • Diabetes Sister        Review of Systems:      - Constitutional: Negative for fever, chills, unexpected weight change, and fatigue/generalized weakness.     - HEENT: Negative for headaches, vision changes, hearing changes, ear pain, ear discharge, rhinorrhea, sinus congestion, sore throat, and neck pain.      - Respiratory: Negative for cough, sputum production, chest congestion, dyspnea, wheezing, and crackles.      - Cardiovascular: Negative for chest pain, palpitations, orthopnea, and bilateral lower extremity edema.     - Gastrointestinal: Negative for heartburn, nausea, vomiting, abdominal pain, hematochezia, melena, diarrhea, constipation, and greasy/foul-smelling stools.     - Genitourinary: Negative for dysuria, polyuria, hematuria, pyuria, urinary urgency, and urinary incontinence.    - Musculoskeletal: Negative for myalgias, back pain, and joint pain.     - Skin: Negative for rash, itching, cyanotic skin color change.     - Neurological: Negative for dizziness, tingling, tremors, focal sensory deficit, focal weakness and headaches.     - Endo/Heme/Allergies: Does not bruise/bleed easily.     - Psychiatric/Behavioral: Negative for depression, suicidal/homicidal ideation and memory loss.          - NOTE: All other  "systems reviewed and are negative, except as in HPI.    Exam:    Blood pressure 130/52, pulse 75, temperature 36.8 °C (98.3 °F), height 1.549 m (5' 1\"), weight 67.9 kg (149 lb 12.8 oz), SpO2 95 %. Body mass index is 28.3 kg/m².    General:  Well nourished, well developed female in NAD  LABS: 7/12/18: Results reviewed and discussed with the patient, questions answered.    Patient was seen for 25 minutes face to face of which, 20 minutes was spent counseling regarding the above mentioned problems.  Assessment/Plan:  1. Prediabetes  Uncontrolled but actually improved.  Patient's frustrated that she cannot get her blood sugars back down into the normal range and I explained to her that overall she is doing well.  We spent some time discussing food choices and exercise.  Unfortunately because her  has now developed dementia which is increased her stress she is homebound and not able to go do her exercise program.  She will continue with current meds and do the best she can.    2. Hypercholesterolemia  Controlled, continue with current meds and lifestyle.      3. Essential hypertension  Controlled, continue with current meds and lifestyle.      Plan will be to see her and her  back in 6 months.  She informs me she still smoking about a quarter of a pack a day.  She does not want her  to know.        "

## 2018-08-27 ENCOUNTER — TELEPHONE (OUTPATIENT)
Dept: MEDICAL GROUP | Facility: MEDICAL CENTER | Age: 78
End: 2018-08-27

## 2018-08-27 NOTE — TELEPHONE ENCOUNTER
"· Medication request paperwork received from [employer] requiring provider signature.     · All appropriate fields completed by Medical Assistant: Yes    · Paperwork placed in \"MA to Provider\" folder/basket. Awaiting provider completion/signature.    "

## 2018-09-11 ENCOUNTER — PATIENT MESSAGE (OUTPATIENT)
Dept: MEDICAL GROUP | Facility: MEDICAL CENTER | Age: 78
End: 2018-09-11

## 2018-09-11 DIAGNOSIS — Z12.11 COLON CANCER SCREENING: ICD-10-CM

## 2018-09-11 NOTE — TELEPHONE ENCOUNTER
1. Caller Name: Pam Adame                                       Call Back Number: [phone] (home)         Patient approves a detailed voicemail message: yes    2. SPECIFIC Action To Be Taken: Orders pending, please sign.    3. Diagnosis/Clinical Reason for Request: Annual Colon cancer screen    4. Specialty & Provider Name/Lab/Imaging Location: [facility]    5. Is appointment scheduled for requested order/referral: no    Patient was informed they will receive a return phone call from the office ONLY if there are any questions before processing their request. Advised to call back if they haven't received a call from the referral department in 5 days.

## 2018-09-11 NOTE — TELEPHONE ENCOUNTER
----- Message from Pam Adame sent at 9/11/2018 10:36 AM PDT -----  Regarding: Non-Urgent Medical Question  Contact: [phone]  I notice I am in need of a couple of shots and Fit Test etc. Do I need to schedule an appointment for these?  My  also need Flu shot and a couple of other shots.  Thank you for letting us know how to proceed since we are not scheduled to see you until January, 2019.  Pam

## 2018-09-20 ENCOUNTER — NON-PROVIDER VISIT (OUTPATIENT)
Dept: MEDICAL GROUP | Facility: MEDICAL CENTER | Age: 78
End: 2018-09-20
Payer: MEDICARE

## 2018-09-20 DIAGNOSIS — Z23 FLU VACCINE NEED: ICD-10-CM

## 2018-09-20 PROCEDURE — G0008 ADMIN INFLUENZA VIRUS VAC: HCPCS | Performed by: FAMILY MEDICINE

## 2018-09-20 PROCEDURE — 90662 IIV NO PRSV INCREASED AG IM: CPT | Performed by: FAMILY MEDICINE

## 2019-01-14 ENCOUNTER — HOSPITAL ENCOUNTER (OUTPATIENT)
Dept: LAB | Facility: MEDICAL CENTER | Age: 79
End: 2019-01-14
Attending: FAMILY MEDICINE
Payer: MEDICARE

## 2019-01-14 DIAGNOSIS — E78.00 HYPERCHOLESTEROLEMIA: ICD-10-CM

## 2019-01-14 DIAGNOSIS — R73.03 PREDIABETES: ICD-10-CM

## 2019-01-14 LAB
ALBUMIN SERPL BCP-MCNC: 4.1 G/DL (ref 3.2–4.9)
ALBUMIN/GLOB SERPL: 1.6 G/DL
ALP SERPL-CCNC: 77 U/L (ref 30–99)
ALT SERPL-CCNC: 14 U/L (ref 2–50)
ANION GAP SERPL CALC-SCNC: 8 MMOL/L (ref 0–11.9)
AST SERPL-CCNC: 15 U/L (ref 12–45)
BILIRUB SERPL-MCNC: 0.5 MG/DL (ref 0.1–1.5)
BUN SERPL-MCNC: 25 MG/DL (ref 8–22)
CALCIUM SERPL-MCNC: 9.7 MG/DL (ref 8.5–10.5)
CHLORIDE SERPL-SCNC: 100 MMOL/L (ref 96–112)
CHOLEST SERPL-MCNC: 138 MG/DL (ref 100–199)
CO2 SERPL-SCNC: 25 MMOL/L (ref 20–33)
CREAT SERPL-MCNC: 0.92 MG/DL (ref 0.5–1.4)
EST. AVERAGE GLUCOSE BLD GHB EST-MCNC: 137 MG/DL
FASTING STATUS PATIENT QL REPORTED: NORMAL
GLOBULIN SER CALC-MCNC: 2.6 G/DL (ref 1.9–3.5)
GLUCOSE SERPL-MCNC: 99 MG/DL (ref 65–99)
HBA1C MFR BLD: 6.4 % (ref 0–5.6)
HDLC SERPL-MCNC: 46 MG/DL
LDLC SERPL CALC-MCNC: 72 MG/DL
POTASSIUM SERPL-SCNC: 4.2 MMOL/L (ref 3.6–5.5)
PROT SERPL-MCNC: 6.7 G/DL (ref 6–8.2)
SODIUM SERPL-SCNC: 133 MMOL/L (ref 135–145)
TRIGL SERPL-MCNC: 101 MG/DL (ref 0–149)

## 2019-01-14 PROCEDURE — 80053 COMPREHEN METABOLIC PANEL: CPT

## 2019-01-14 PROCEDURE — 83036 HEMOGLOBIN GLYCOSYLATED A1C: CPT | Mod: GA

## 2019-01-14 PROCEDURE — 36415 COLL VENOUS BLD VENIPUNCTURE: CPT

## 2019-01-14 PROCEDURE — 80061 LIPID PANEL: CPT

## 2019-01-21 ENCOUNTER — OFFICE VISIT (OUTPATIENT)
Dept: MEDICAL GROUP | Facility: MEDICAL CENTER | Age: 79
End: 2019-01-21
Payer: MEDICARE

## 2019-01-21 VITALS
WEIGHT: 151.68 LBS | OXYGEN SATURATION: 97 % | RESPIRATION RATE: 14 BRPM | SYSTOLIC BLOOD PRESSURE: 110 MMHG | TEMPERATURE: 98.2 F | HEART RATE: 68 BPM | DIASTOLIC BLOOD PRESSURE: 78 MMHG | BODY MASS INDEX: 28.64 KG/M2 | HEIGHT: 61 IN

## 2019-01-21 DIAGNOSIS — R73.03 PREDIABETES: ICD-10-CM

## 2019-01-21 DIAGNOSIS — I10 ESSENTIAL HYPERTENSION: ICD-10-CM

## 2019-01-21 DIAGNOSIS — Z12.12 SCREENING FOR COLORECTAL CANCER: ICD-10-CM

## 2019-01-21 DIAGNOSIS — E78.00 HYPERCHOLESTEROLEMIA: ICD-10-CM

## 2019-01-21 DIAGNOSIS — Z12.11 SCREENING FOR COLORECTAL CANCER: ICD-10-CM

## 2019-01-21 DIAGNOSIS — E89.0 POSTABLATIVE HYPOTHYROIDISM: ICD-10-CM

## 2019-01-21 DIAGNOSIS — F17.200 TOBACCO USE DISORDER, MILD, ABUSE: ICD-10-CM

## 2019-01-21 PROCEDURE — 99214 OFFICE O/P EST MOD 30 MIN: CPT | Performed by: FAMILY MEDICINE

## 2019-01-21 ASSESSMENT — PATIENT HEALTH QUESTIONNAIRE - PHQ9: CLINICAL INTERPRETATION OF PHQ2 SCORE: 0

## 2019-01-21 NOTE — ASSESSMENT & PLAN NOTE
This is a chronic health problem for this patient for which she is on thyroid replacement with levothyroxine 100 mcg daily.  She will continue with this med long-term.  It will be time to do blood work will be see her back in approximately 6 months.

## 2019-01-21 NOTE — PROGRESS NOTES
CC:Diagnoses of Essential hypertension, Hypercholesterolemia, Tobacco use disorder, mild, abuse, Screening for colorectal cancer, Postablative hypothyroidism, and Prediabetes were pertinent to this visit.    HISTORY OF PRESENT ILLNESS: Patient is a 78 y.o. female established patient who presents today to follow-up on chronic health problems and blood work done prior to the visit.  Patient also brings up the fact that she has decided she does not want to do mammograms or bone density test any further.  We have made those adjustments to her health maintenance.    Health Maintenance: Completed    Essential hypertension  This is a chronic health problem that is well controlled with current medications and lifestyle measures. Her BP is good at 110/78. The patient denies chest pain, shortness of breath or dyspnea on exertion.      Hypercholesterolemia  This is a chronic health problem that is well controlled with current medications and lifestyle measures. Her total cholesterol is great at 138, triglycerides good at 101, HDL 46 and LDL 72.     Tobacco use disorder, mild, abuse  Pt continues to smoke at the [email or web address] when she goes off and on.    Postablative hypothyroidism  This is a chronic health problem for this patient for which she is on thyroid replacement with levothyroxine 100 mcg daily.  She will continue with this med long-term.  It will be time to do blood work will be see her back in approximately 6 months.    Prediabetes  This is a chronic health problem for this patient that is well-controlled with Glucophage.  Her A1c is down to 6.4 and her fasting glucose was 99.  She will continue with current medications.      Patient Active Problem List    Diagnosis Date Noted   • Coronary artery disease involving native coronary artery of native heart without angina pectoris 09/14/2017   • Postablative hypothyroidism 09/14/2017   • Hypercholesterolemia 09/14/2017   • Tobacco use disorder, mild, abuse 09/14/2017   •  Prediabetes 09/14/2017   • Essential hypertension 09/14/2017      Allergies:Patient has no known allergies.    Current Outpatient Prescriptions   Medication Sig Dispense Refill   • levothyroxine (SYNTHROID) 100 MCG Tab Take 1 Tab by mouth Every morning on an empty stomach. 90 Tab 3   • atorvastatin (LIPITOR) 80 MG tablet Take 1 Tab by mouth every day. 90 Tab 3   • hydroCHLOROthiazide (HYDRODIURIL) 25 MG Tab Take 1 Tab by mouth every day. 90 Tab 3   • lisinopril (PRINIVIL) 10 MG Tab Take 1 Tab by mouth every day. 90 Tab 3   • metformin (GLUCOPHAGE) 1000 MG tablet Take 1 Tab by mouth 2 times a day, with meals. 180 Tab 3   • aspirin (ASA) 81 MG Chew Tab chewable tablet Take 81 mg by mouth every day.       No current facility-administered medications for this visit.        Social History   Substance Use Topics   • Smoking status: Current Some Day Smoker     Packs/day: 0.25   • Smokeless tobacco: Never Used   • Alcohol use No     Social History     Social History Narrative   • No narrative on file       Family History   Problem Relation Age of Onset   • Cancer Mother         lung   • Cancer Father         colon   • Lung Cancer Father    • Heart Disease Sister    • Diabetes Sister         ROS:     - Constitutional:  Negative for fever, chills, unexpected weight change, and fatigue/generalized weakness.    - HEENT:  Negative for headaches, vision changes, hearing changes, ear pain, ear discharge, rhinorrhea, sinus congestion, sore throat, and neck pain.      - Respiratory: Negative for cough, sputum production, chest congestion, dyspnea, wheezing, and crackles.      - Cardiovascular: Negative for chest pain, palpitations, orthopnea, and bilateral lower extremity edema.     - Gastrointestinal: Negative for heartburn, nausea, vomiting, abdominal pain, hematochezia, melena, diarrhea, constipation, and greasy/foul-smelling stools.     - Genitourinary: Negative for dysuria, polyuria, hematuria, pyuria, urinary urgency, and  "urinary incontinence.     - Musculoskeletal: Negative for myalgias, back pain, and joint pain.     - Skin: Negative for rash, itching, cyanotic skin color change.     - Neurological: Negative for dizziness, tingling, tremors, focal sensory deficit, focal weakness and headaches.     - Endo/Heme/Allergies: Does not bruise/bleed easily.     - Psychiatric/Behavioral: Negative for depression, suicidal/homicidal ideation and memory loss.          - NOTE: All other systems reviewed and are negative, except as in HPI.      Exam:    Blood pressure 110/78, pulse 68, temperature 36.8 °C (98.2 °F), temperature source Temporal, resp. rate 14, height 1.549 m (5' 1\"), weight 68.8 kg (151 lb 10.8 oz), SpO2 97 %, not currently breastfeeding. Body mass index is 28.66 kg/m².    General:  Well nourished, well developed female in NAD  Head is grossly normal.  Neck: Supple without JVD or bruit. Thyroid is not enlarged.  Pulmonary: Clear to ausculation and percussion.  Normal effort. No rales, ronchi, or wheezing.  Cardiovascular: Regular rate and rhythm without murmur. Carotid and radial pulses are intact and equal bilaterally.  Extremities: no clubbing, cyanosis, or edema.  LABS: 1/14/19: Results reviewed and discussed with the patient, questions answered.    Please note that this dictation was created using voice recognition software. I have made every reasonable attempt to correct obvious errors, but I expect that there are errors of grammar and possibly content that I did not discover before finalizing the note.    Assessment/Plan:  1. Essential hypertension  Controlled, continue with current meds and lifestyle.      2. Hypercholesterolemia  Controlled, continue with current meds and lifestyle.      3. Tobacco use disorder, mild, abuse  Patient continues to smoke when she goes to the [employer] which is about for 2 hours a day.  Smoking less than a quarter of a pack a day.    4. Screening for colorectal cancer  Patient will do fit " testing  - OCCULT BLOOD FECES IMMUNOASSAY (FIT); Future    5. Postablative hypothyroidism  We will get thyroid testing for her in the next 6 months    6. Prediabetes  Controlled, continue with current meds and lifestyle.

## 2019-01-21 NOTE — ASSESSMENT & PLAN NOTE
This is a chronic health problem that is well controlled with current medications and lifestyle measures. Her BP is good at 110/78. The patient denies chest pain, shortness of breath or dyspnea on exertion.

## 2019-01-21 NOTE — ASSESSMENT & PLAN NOTE
This is a chronic health problem for this patient that is well-controlled with Glucophage.  Her A1c is down to 6.4 and her fasting glucose was 99.  She will continue with current medications.

## 2019-01-21 NOTE — ASSESSMENT & PLAN NOTE
This is a chronic health problem that is well controlled with current medications and lifestyle measures. Her total cholesterol is great at 138, triglycerides good at 101, HDL 46 and LDL 72.

## 2019-01-24 ENCOUNTER — HOSPITAL ENCOUNTER (OUTPATIENT)
Facility: MEDICAL CENTER | Age: 79
End: 2019-01-24
Attending: FAMILY MEDICINE
Payer: MEDICARE

## 2019-01-24 PROCEDURE — 82274 ASSAY TEST FOR BLOOD FECAL: CPT

## 2019-01-29 DIAGNOSIS — Z12.11 SCREENING FOR COLORECTAL CANCER: ICD-10-CM

## 2019-01-29 DIAGNOSIS — Z12.12 SCREENING FOR COLORECTAL CANCER: ICD-10-CM

## 2019-01-29 LAB — HEMOCCULT STL QL IA: NEGATIVE

## 2019-03-25 RX ORDER — HYDROCHLOROTHIAZIDE 25 MG/1
TABLET ORAL
Qty: 90 TAB | Refills: 3 | Status: SHIPPED | OUTPATIENT
Start: 2019-03-25 | End: 2020-04-13

## 2019-03-25 RX ORDER — LISINOPRIL 10 MG/1
TABLET ORAL
Qty: 90 TAB | Refills: 3 | Status: SHIPPED | OUTPATIENT
Start: 2019-03-25 | End: 2020-03-26

## 2019-03-25 RX ORDER — ATORVASTATIN CALCIUM 80 MG/1
TABLET, FILM COATED ORAL
Qty: 90 TAB | Refills: 3 | Status: SHIPPED | OUTPATIENT
Start: 2019-03-25 | End: 2020-04-13

## 2019-04-02 ENCOUNTER — TELEPHONE (OUTPATIENT)
Dept: MEDICAL GROUP | Facility: MEDICAL CENTER | Age: 79
End: 2019-04-02

## 2019-04-02 NOTE — TELEPHONE ENCOUNTER
ANNUAL WELLNESS VISIT PRE-VISIT PLANNING WITHOUT OUTREACH    1.  Reviewed note from last office visit with PCP: YES    2.  If any orders were placed at last visit, do we have Results/Consult Notes?        •  Labs - Labs ordered, completed on 1/14/19, 1/24/19 and results are in chart.  Note: If patient appointment is for lab review and patient did not complete labs, check with provider if OK to reschedule patient until labs completed.       •  Imaging - Imaging was not ordered at last office visit.       •  Referrals - No referrals were ordered at last office visit.    3.  Immunizations were updated in [facility] using WebIZ?: Yes       •  WebIZ Recommendations: TDAP and SHINGRIX (Shingles)        •  Is patient due for Tdap? YES. Patient was notified of copay/out of pocket cost.       •  Is patient due for Shingrix? YES. Patient was notified of copay/out of pocket cost.     4.  Patient is due for the following Health Maintenance Topics:   Health Maintenance Due   Topic Date Due   • Annual Wellness Visit  1940   • IMM DTaP/Tdap/Td Vaccine (1 - Tdap) 12/04/1961   • IMM ZOSTER VACCINES (1 of 2) 12/04/1992       5.  Reviewed/Updated the following with patient:       •   Preferred Pharmacy? YES       •   Preferred Lab? YES       •   Preferred Communication? YES       •   Allergies? YES       •   Medications? YES. Was Abstract Encounter opened and chart updated? YES       •   Social History? YES. Was Abstract Encounter opened and chart updated? YES       •   Family History (document living status of immediate family members and if + hx of  cancer, diabetes, hypertension, hyperlipidemia, heart attack, stroke) YES. Was Abstract Encounter opened and chart updated? YES    6.  Care Team Updated:       •   DME Company (gait device, O2, CPAP, etc.): NO       •   Other Specialists (eye doctor, derm, GYN, cardiology, endo, etc): N\A    7. Orders for overdue Health Maintenance topics pended in Pre-Charting? NO    8.  Patient has the  following Care Path diagnoses on Problem List:  NONE    9.  Patient was advised: “This is a free wellness visit. The provider will screen for medical conditions to help you stay healthy. If you have other concerns to address you may be asked to discuss these at a separate visit or there may be an additional fee.”     10.  Patient was informed to arrive 15 min prior to their scheduled appointment and bring in their medication bottles.

## 2019-04-04 ENCOUNTER — OFFICE VISIT (OUTPATIENT)
Dept: MEDICAL GROUP | Facility: MEDICAL CENTER | Age: 79
End: 2019-04-04
Payer: MEDICARE

## 2019-04-04 VITALS
DIASTOLIC BLOOD PRESSURE: 76 MMHG | HEIGHT: 61 IN | HEART RATE: 70 BPM | OXYGEN SATURATION: 93 % | BODY MASS INDEX: 28.7 KG/M2 | WEIGHT: 152.01 LBS | SYSTOLIC BLOOD PRESSURE: 124 MMHG | TEMPERATURE: 98.7 F

## 2019-04-04 DIAGNOSIS — R73.03 PREDIABETES: ICD-10-CM

## 2019-04-04 DIAGNOSIS — I25.10 CORONARY ARTERY DISEASE INVOLVING NATIVE CORONARY ARTERY OF NATIVE HEART WITHOUT ANGINA PECTORIS: ICD-10-CM

## 2019-04-04 DIAGNOSIS — I10 ESSENTIAL HYPERTENSION: ICD-10-CM

## 2019-04-04 DIAGNOSIS — Z00.00 MEDICARE ANNUAL WELLNESS VISIT, SUBSEQUENT: ICD-10-CM

## 2019-04-04 DIAGNOSIS — E78.00 HYPERCHOLESTEROLEMIA: ICD-10-CM

## 2019-04-04 DIAGNOSIS — F17.200 TOBACCO USE DISORDER, MILD, ABUSE: ICD-10-CM

## 2019-04-04 DIAGNOSIS — E89.0 POSTABLATIVE HYPOTHYROIDISM: ICD-10-CM

## 2019-04-04 PROCEDURE — G0439 PPPS, SUBSEQ VISIT: HCPCS | Performed by: FAMILY MEDICINE

## 2019-04-04 ASSESSMENT — ACTIVITIES OF DAILY LIVING (ADL): BATHING_REQUIRES_ASSISTANCE: 0

## 2019-04-04 ASSESSMENT — PATIENT HEALTH QUESTIONNAIRE - PHQ9: CLINICAL INTERPRETATION OF PHQ2 SCORE: 0

## 2019-04-04 ASSESSMENT — ENCOUNTER SYMPTOMS: GENERAL WELL-BEING: GOOD

## 2019-04-04 NOTE — ASSESSMENT & PLAN NOTE
This is a chronic health problem that is well controlled with current medications and lifestyle measures.  Will continue to follow with labs in 3 months.

## 2019-04-04 NOTE — ASSESSMENT & PLAN NOTE
This is a chronic health problem that is well controlled with current medications and lifestyle measures.  She limits her smoking only to when she is at the casinos because she does not want to smoke around her .

## 2019-04-04 NOTE — ASSESSMENT & PLAN NOTE
This is a chronic health problem that is well controlled with current medications and lifestyle measures.  Patient continues on her thyroid replacement with levothyroxine 100 mcg daily.

## 2019-04-04 NOTE — ASSESSMENT & PLAN NOTE
This is a chronic health problem that is well controlled with current medications and lifestyle measures.  Her BP is perfect at 124/76. The patient denies chest pain, shortness of breath or dyspnea on exertion.

## 2019-04-04 NOTE — LETTER
[facility]  Ryanne Leos M.D.  [address]  [place] [address]  Fax: [phone]   Authorization for Release/Disclosure of   Protected Health Information   Name: KALANI ALMEIDA : 1940 SSN: [id number]   Address: [address] [facility]  [address]  [place] [address] Phone:    [phone] (home)    I authorize the entity listed below to release/disclose the PHI below to:   [facility]/Ryanne Leos M.D. and Ryanne Leos M.D.   Provider or Entity Name:  [facility]   Address   City, [facility]   Phone:  [phone]    Fax:  [phone]   Reason for request: continuity of care   Information to be released:    [  ] LAST COLONOSCOPY,  including any PATH REPORT and follow-up  [  ] LAST FIT/COLOGUARD RESULT [  ] LAST DEXA  [  ] LAST MAMMOGRAM  [  ] LAST PAP  [  ] LAST LABS [  ] RETINA EXAM REPORT  [XXX] IMMUNIZATION RECORDS  [  ] Release all info      [  ] Check here and initial the line next to each item to release ALL health information INCLUDING  _____ Care and treatment for drug and / or alcohol abuse  _____ HIV testing, infection status, or AIDS  _____ Genetic Testing    DATES OF SERVICE OR TIME PERIOD TO BE DISCLOSED: _____________  I understand and acknowledge that:  * This Authorization may be revoked at any time by you in writing, except if your health information has already been used or disclosed.  * Your health information that will be used or disclosed as a result of you signing this authorization could be re-disclosed by the recipient. If this occurs, your re-disclosed health information may no longer be protected by State or Federal laws.  * You may refuse to sign this Authorization. Your refusal will not affect your ability to obtain treatment.  * This Authorization becomes effective upon signing and will  on (date) __________.      If no date is indicated, this Authorization will  one (1) year from the signature date.    Name: Kalani Manuel  Deep    Signature:continuity of care   Date:     4/4/2019       PLEASE FAX REQUESTED RECORDS BACK TO: [phone]

## 2019-04-04 NOTE — ASSESSMENT & PLAN NOTE
This is a chronic health problem that is well controlled with current medications and lifestyle measures.  Continues to follow with cardiology

## 2019-07-03 ENCOUNTER — HOSPITAL ENCOUNTER (OUTPATIENT)
Dept: LAB | Facility: MEDICAL CENTER | Age: 79
End: 2019-07-03
Attending: FAMILY MEDICINE
Payer: MEDICARE

## 2019-07-03 DIAGNOSIS — E78.00 HYPERCHOLESTEROLEMIA: ICD-10-CM

## 2019-07-03 DIAGNOSIS — R73.03 PREDIABETES: ICD-10-CM

## 2019-07-03 DIAGNOSIS — E89.0 POSTABLATIVE HYPOTHYROIDISM: ICD-10-CM

## 2019-07-03 LAB
ALBUMIN SERPL BCP-MCNC: 4.1 G/DL (ref 3.2–4.9)
ALBUMIN/GLOB SERPL: 1.6 G/DL
ALP SERPL-CCNC: 81 U/L (ref 30–99)
ALT SERPL-CCNC: 12 U/L (ref 2–50)
ANION GAP SERPL CALC-SCNC: 7 MMOL/L (ref 0–11.9)
AST SERPL-CCNC: 16 U/L (ref 12–45)
BILIRUB SERPL-MCNC: 0.5 MG/DL (ref 0.1–1.5)
BUN SERPL-MCNC: 20 MG/DL (ref 8–22)
CALCIUM SERPL-MCNC: 9.1 MG/DL (ref 8.5–10.5)
CHLORIDE SERPL-SCNC: 102 MMOL/L (ref 96–112)
CHOLEST SERPL-MCNC: 152 MG/DL (ref 100–199)
CO2 SERPL-SCNC: 27 MMOL/L (ref 20–33)
CREAT SERPL-MCNC: 0.81 MG/DL (ref 0.5–1.4)
EST. AVERAGE GLUCOSE BLD GHB EST-MCNC: 143 MG/DL
FASTING STATUS PATIENT QL REPORTED: NORMAL
GLOBULIN SER CALC-MCNC: 2.5 G/DL (ref 1.9–3.5)
GLUCOSE SERPL-MCNC: 104 MG/DL (ref 65–99)
HBA1C MFR BLD: 6.6 % (ref 0–5.6)
HDLC SERPL-MCNC: 50 MG/DL
LDLC SERPL CALC-MCNC: 82 MG/DL
POTASSIUM SERPL-SCNC: 4.5 MMOL/L (ref 3.6–5.5)
PROT SERPL-MCNC: 6.6 G/DL (ref 6–8.2)
SODIUM SERPL-SCNC: 136 MMOL/L (ref 135–145)
TRIGL SERPL-MCNC: 99 MG/DL (ref 0–149)
TSH SERPL DL<=0.005 MIU/L-ACNC: 1.55 UIU/ML (ref 0.38–5.33)

## 2019-07-03 PROCEDURE — 80053 COMPREHEN METABOLIC PANEL: CPT

## 2019-07-03 PROCEDURE — 36415 COLL VENOUS BLD VENIPUNCTURE: CPT

## 2019-07-03 PROCEDURE — 83036 HEMOGLOBIN GLYCOSYLATED A1C: CPT | Mod: GA

## 2019-07-03 PROCEDURE — 80061 LIPID PANEL: CPT

## 2019-07-03 PROCEDURE — 84443 ASSAY THYROID STIM HORMONE: CPT

## 2019-07-11 ENCOUNTER — OFFICE VISIT (OUTPATIENT)
Dept: MEDICAL GROUP | Facility: MEDICAL CENTER | Age: 79
End: 2019-07-11
Payer: MEDICARE

## 2019-07-11 VITALS
OXYGEN SATURATION: 96 % | WEIGHT: 150.13 LBS | TEMPERATURE: 98.2 F | HEART RATE: 82 BPM | SYSTOLIC BLOOD PRESSURE: 120 MMHG | HEIGHT: 61 IN | DIASTOLIC BLOOD PRESSURE: 72 MMHG | RESPIRATION RATE: 12 BRPM | BODY MASS INDEX: 28.35 KG/M2

## 2019-07-11 DIAGNOSIS — E78.00 HYPERCHOLESTEROLEMIA: ICD-10-CM

## 2019-07-11 DIAGNOSIS — I25.10 CORONARY ARTERY DISEASE INVOLVING NATIVE CORONARY ARTERY OF NATIVE HEART WITHOUT ANGINA PECTORIS: ICD-10-CM

## 2019-07-11 DIAGNOSIS — I10 ESSENTIAL HYPERTENSION: ICD-10-CM

## 2019-07-11 DIAGNOSIS — R73.03 PREDIABETES: ICD-10-CM

## 2019-07-11 PROCEDURE — 99214 OFFICE O/P EST MOD 30 MIN: CPT | Performed by: FAMILY MEDICINE

## 2019-07-11 NOTE — ASSESSMENT & PLAN NOTE
This is a chronic health problem where the patient's blood sugar is excellent at 104 but her a wait-and-see went up slightly to 6.6.  Patient admits that she got onto an ice cream binge and more than likely that would shot her A1c up.  She is now off of that, thus her blood sugars back down.  She will continue just being on her Glucophage she does not need anything else.  She does need to be a little bit more careful with her food choices.

## 2019-07-11 NOTE — ASSESSMENT & PLAN NOTE
This is a chronic health problem that is well controlled with current medications and lifestyle measures. Her total cholesterol is good at 152, triglycerides 99, HDL 50, LDL 82.  Pt will continue with current meds.  No liver dysfunction.

## 2019-07-11 NOTE — PROGRESS NOTES
CC:Diagnoses of Coronary artery disease involving native coronary artery of native heart without angina pectoris, Essential hypertension, Prediabetes, and Hypercholesterolemia were pertinent to this visit.    HISTORY OF PRESENT ILLNESS: Patient is a 78 y.o. female established patient who presents today to talk about chronic health problems and review her labs that were done on 7/3/2019.  Patient admits immediately that she gone off on a binge of ice cream more than once a day and that is why her blood sugars went up.  She is now stopped that.  She knows she cannot continue.    Health Maintenance: Completed    Coronary artery disease involving native coronary artery of native heart without angina pectoris  This is a chronic health problem that is well controlled with current medications and lifestyle measures.    Essential hypertension  This is a chronic health problem that is well controlled with current medications and lifestyle measures. Her BP is excellent 120/72. The patient denies chest pain, shortness of breath or dyspnea on exertion.      Prediabetes  This is a chronic health problem where the patient's blood sugar is excellent at 104 but her a wait-and-see went up slightly to 6.6.  Patient admits that she got onto an ice cream binge and more than likely that would shot her A1c up.  She is now off of that, thus her blood sugars back down.  She will continue just being on her Glucophage she does not need anything else.  She does need to be a little bit more careful with her food choices.    Hypercholesterolemia  This is a chronic health problem that is well controlled with current medications and lifestyle measures. Her total cholesterol is good at 152, triglycerides 99, HDL 50, LDL 82.  Pt will continue with current meds.  No liver dysfunction.      Patient Active Problem List    Diagnosis Date Noted   • Coronary artery disease involving native coronary artery of native heart without angina pectoris  09/14/2017   • Postablative hypothyroidism 09/14/2017   • Hypercholesterolemia 09/14/2017   • Tobacco use disorder, mild, abuse 09/14/2017   • Prediabetes 09/14/2017   • Essential hypertension 09/14/2017      Allergies:Patient has no known allergies.    Current Outpatient Prescriptions   Medication Sig Dispense Refill   • atorvastatin (LIPITOR) 80 MG tablet TAKE 1 TABLET DAILY 90 Tab 3   • hydroCHLOROthiazide (HYDRODIURIL) 25 MG Tab TAKE 1 TABLET DAILY 90 Tab 3   • lisinopril (PRINIVIL) 10 MG Tab TAKE 1 TABLET DAILY 90 Tab 3   • metformin (GLUCOPHAGE) 1000 MG tablet TAKE 1 TABLET TWICE A DAY WITH MEALS 180 Tab 3   • levothyroxine (SYNTHROID) 100 MCG Tab Take 1 Tab by mouth Every morning on an empty stomach. 90 Tab 3   • aspirin (ASA) 81 MG Chew Tab chewable tablet Take 81 mg by mouth every day.       No current facility-administered medications for this visit.        Social History   Substance Use Topics   • Smoking status: Current Some Day Smoker     Packs/day: 0.25   • Smokeless tobacco: Never Used   • Alcohol use No     Social History     Social History Narrative   • No narrative on file       Family History   Problem Relation Age of Onset   • Cancer Mother         lung   • Cancer Father         colon   • Lung Cancer Father    • Heart Disease Sister    • Diabetes Sister         ROS:     - Constitutional:  Negative for fever, chills, unexpected weight change, and fatigue/generalized weakness.    - HEENT:  Negative for headaches, vision changes, hearing changes, ear pain, ear discharge, rhinorrhea, sinus congestion, sore throat, and neck pain.      - Respiratory: Negative for cough, sputum production, chest congestion, dyspnea, wheezing, and crackles.      - Cardiovascular: Negative for chest pain, palpitations, orthopnea, and bilateral lower extremity edema.     - Gastrointestinal: Negative for heartburn, nausea, vomiting, abdominal pain, hematochezia, melena, diarrhea, constipation, and greasy/foul-smelling  "stools.     - Genitourinary: Negative for dysuria, polyuria, hematuria, pyuria, urinary urgency, and urinary incontinence.     - Musculoskeletal: Negative for myalgias, back pain, and joint pain.     - Skin: Negative for rash, itching, cyanotic skin color change.     - Neurological: Negative for dizziness, tingling, tremors, focal sensory deficit, focal weakness and headaches.     - Endo/Heme/Allergies: Does not bruise/bleed easily.     - Psychiatric/Behavioral: Negative for depression, suicidal/homicidal ideation and memory loss.          - NOTE: All other systems reviewed and are negative, except as in HPI.      Exam:    /72 (BP Location: Left arm, Patient Position: Sitting, BP Cuff Size: Adult)   Pulse 82   Temp 36.8 °C (98.2 °F) (Temporal)   Resp 12   Ht 1.549 m (5' 1\")   Wt 68.1 kg (150 lb 2.1 oz)   SpO2 96%  Body mass index is 28.37 kg/m².    General:  Well nourished, well developed female in NAD  Head is grossly normal.  Neck: Supple without JVD or bruit. Thyroid is not enlarged.  Pulmonary: Clear to ausculation and percussion.  Normal effort. No rales, ronchi, or wheezing.  Cardiovascular: Regular rate and rhythm without murmur. Carotid and radial pulses are intact and equal bilaterally.  Extremities: no clubbing, cyanosis, or edema.    Please note that this dictation was created using voice recognition software. I have made every reasonable attempt to correct obvious errors, but I expect that there are errors of grammar and possibly content that I did not discover before finalizing the note.    Assessment/Plan:  1. Coronary artery disease involving native coronary artery of native heart without angina pectoris  Stable, continue with current meds.    2. Essential hypertension  Controlled, continue with current meds and lifestyle.      3. Prediabetes  Controlled, continue with current meds and lifestyle.    - HEMOGLOBIN A1C; Future    4. Hypercholesterolemia  Controlled, continue with current meds " and lifestyle.    - Comp Metabolic Panel; Future  - Lipid Profile; Future

## 2019-07-11 NOTE — ASSESSMENT & PLAN NOTE
This is a chronic health problem that is well controlled with current medications and lifestyle measures. Her BP is excellent 120/72. The patient denies chest pain, shortness of breath or dyspnea on exertion.

## 2019-10-01 ENCOUNTER — NON-PROVIDER VISIT (OUTPATIENT)
Dept: MEDICAL GROUP | Facility: MEDICAL CENTER | Age: 79
End: 2019-10-01
Payer: MEDICARE

## 2019-10-01 DIAGNOSIS — Z23 NEED FOR VACCINATION: ICD-10-CM

## 2019-10-01 PROCEDURE — G0008 ADMIN INFLUENZA VIRUS VAC: HCPCS | Performed by: FAMILY MEDICINE

## 2019-10-01 PROCEDURE — 90662 IIV NO PRSV INCREASED AG IM: CPT | Performed by: FAMILY MEDICINE

## 2019-10-01 NOTE — NON-PROVIDER
"Pam Adame is a 78 y.o. female here for a non-provider visit for:   FLU    Reason for immunization: Annual Flu Vaccine  Immunization records indicate need for vaccine: Yes, confirmed with Epic  Minimum interval has been met for this vaccine: Yes  ABN completed: Not Indicated    Order and dose verified by: BRIDGET  VIS Dated  08/15/2019 was given to patient: Yes  All IAC Questionnaire questions were answered \"No.\"    Patient tolerated injection and no adverse effects were observed or reported: Yes    Pt scheduled for next dose in series: Not Indicated    "

## 2020-01-06 ENCOUNTER — HOSPITAL ENCOUNTER (OUTPATIENT)
Dept: LAB | Facility: MEDICAL CENTER | Age: 80
End: 2020-01-06
Attending: FAMILY MEDICINE
Payer: MEDICARE

## 2020-01-06 DIAGNOSIS — R73.03 PREDIABETES: ICD-10-CM

## 2020-01-06 DIAGNOSIS — E78.00 HYPERCHOLESTEROLEMIA: ICD-10-CM

## 2020-01-06 LAB
ALBUMIN SERPL BCP-MCNC: 4 G/DL (ref 3.2–4.9)
ALBUMIN/GLOB SERPL: 1.4 G/DL
ALP SERPL-CCNC: 76 U/L (ref 30–99)
ALT SERPL-CCNC: 13 U/L (ref 2–50)
ANION GAP SERPL CALC-SCNC: 7 MMOL/L (ref 0–11.9)
AST SERPL-CCNC: 17 U/L (ref 12–45)
BILIRUB SERPL-MCNC: 0.7 MG/DL (ref 0.1–1.5)
BUN SERPL-MCNC: 18 MG/DL (ref 8–22)
CALCIUM SERPL-MCNC: 9.5 MG/DL (ref 8.5–10.5)
CHLORIDE SERPL-SCNC: 100 MMOL/L (ref 96–112)
CHOLEST SERPL-MCNC: 154 MG/DL (ref 100–199)
CO2 SERPL-SCNC: 28 MMOL/L (ref 20–33)
CREAT SERPL-MCNC: 0.84 MG/DL (ref 0.5–1.4)
FASTING STATUS PATIENT QL REPORTED: NORMAL
GLOBULIN SER CALC-MCNC: 2.8 G/DL (ref 1.9–3.5)
GLUCOSE SERPL-MCNC: 112 MG/DL (ref 65–99)
HDLC SERPL-MCNC: 52 MG/DL
LDLC SERPL CALC-MCNC: 74 MG/DL
POTASSIUM SERPL-SCNC: 4.5 MMOL/L (ref 3.6–5.5)
PROT SERPL-MCNC: 6.8 G/DL (ref 6–8.2)
SODIUM SERPL-SCNC: 135 MMOL/L (ref 135–145)
TRIGL SERPL-MCNC: 141 MG/DL (ref 0–149)

## 2020-01-06 PROCEDURE — 80053 COMPREHEN METABOLIC PANEL: CPT

## 2020-01-06 PROCEDURE — 83036 HEMOGLOBIN GLYCOSYLATED A1C: CPT | Mod: GA

## 2020-01-06 PROCEDURE — 80061 LIPID PANEL: CPT

## 2020-01-06 PROCEDURE — 36415 COLL VENOUS BLD VENIPUNCTURE: CPT

## 2020-01-07 LAB
EST. AVERAGE GLUCOSE BLD GHB EST-MCNC: 131 MG/DL
HBA1C MFR BLD: 6.2 % (ref 0–5.6)

## 2020-01-13 ENCOUNTER — OFFICE VISIT (OUTPATIENT)
Dept: MEDICAL GROUP | Facility: MEDICAL CENTER | Age: 80
End: 2020-01-13
Payer: MEDICARE

## 2020-01-13 VITALS
DIASTOLIC BLOOD PRESSURE: 64 MMHG | HEART RATE: 73 BPM | RESPIRATION RATE: 14 BRPM | SYSTOLIC BLOOD PRESSURE: 116 MMHG | OXYGEN SATURATION: 96 % | TEMPERATURE: 98.3 F | HEIGHT: 61 IN | BODY MASS INDEX: 27.26 KG/M2 | WEIGHT: 144.4 LBS

## 2020-01-13 DIAGNOSIS — I10 ESSENTIAL HYPERTENSION: ICD-10-CM

## 2020-01-13 DIAGNOSIS — R73.03 PREDIABETES: ICD-10-CM

## 2020-01-13 DIAGNOSIS — E78.00 HYPERCHOLESTEROLEMIA: ICD-10-CM

## 2020-01-13 DIAGNOSIS — I25.10 CORONARY ARTERY DISEASE INVOLVING NATIVE CORONARY ARTERY OF NATIVE HEART WITHOUT ANGINA PECTORIS: ICD-10-CM

## 2020-01-13 PROCEDURE — 99214 OFFICE O/P EST MOD 30 MIN: CPT | Performed by: FAMILY MEDICINE

## 2020-01-13 ASSESSMENT — PATIENT HEALTH QUESTIONNAIRE - PHQ9: CLINICAL INTERPRETATION OF PHQ2 SCORE: 0

## 2020-01-13 NOTE — ASSESSMENT & PLAN NOTE
This is a chronic health problem for this patient for which she is on atorvastatin 80 mg daily.  Total cholesterol 154, triglycerides 141, HDL good at 52 and LDL is excellent at 74.  There is no liver dysfunction.  She will continue with current meds.

## 2020-01-13 NOTE — PROGRESS NOTES
CC:Diagnoses of Coronary artery disease involving native coronary artery of native heart without angina pectoris, Essential hypertension, Hypercholesterolemia, and Prediabetes were pertinent to this visit.    HISTORY OF PRESENT ILLNESS: Patient is a 79 y.o. female established patient who presents today to talk about her chronic health problems as outlined below.  Patient did her blood work on 1/6/2020 would like to review those results.      Coronary artery disease involving native coronary artery of native heart without angina pectoris  This is a chronic health problem for this patient that it has done well since she had a triple bypass in 1999.  She is doing secondary prevention with everything.  She will continue on her daily 81 mg aspirin.    Essential hypertension  Chronic health problem for this patient well-controlled with current meds.  BP is excellent at 116/64.  Weight is down to 144.    Hypercholesterolemia  This is a chronic health problem for this patient for which she is on atorvastatin 80 mg daily.  Total cholesterol 154, triglycerides 141, HDL good at 52 and LDL is excellent at 74.  There is no liver dysfunction.  She will continue with current meds.    Prediabetes  This is a chronic health problem that is well controlled with current medications and lifestyle measures.  Patient's fasting glucose was 112 and her A1c is down to 6.2.  She will continue with her metformin 1000 mg daily.      Patient Active Problem List    Diagnosis Date Noted   • Coronary artery disease involving native coronary artery of native heart without angina pectoris 09/14/2017   • Postablative hypothyroidism 09/14/2017   • Hypercholesterolemia 09/14/2017   • Tobacco use disorder, mild, abuse 09/14/2017   • Prediabetes 09/14/2017   • Essential hypertension 09/14/2017      Allergies:Patient has no known allergies.    Current Outpatient Medications   Medication Sig Dispense Refill   • levothyroxine (SYNTHROID) 100 MCG Tab TAKE 1  TABLET EVERY MORNING ON AN EMPTY STOMACH 90 Tab 0   • atorvastatin (LIPITOR) 80 MG tablet TAKE 1 TABLET DAILY 90 Tab 3   • hydroCHLOROthiazide (HYDRODIURIL) 25 MG Tab TAKE 1 TABLET DAILY 90 Tab 3   • lisinopril (PRINIVIL) 10 MG Tab TAKE 1 TABLET DAILY 90 Tab 3   • metformin (GLUCOPHAGE) 1000 MG tablet TAKE 1 TABLET TWICE A DAY WITH MEALS 180 Tab 3   • aspirin (ASA) 81 MG Chew Tab chewable tablet Take 81 mg by mouth every day.       No current facility-administered medications for this visit.        Social History     Tobacco Use   • Smoking status: Current Some Day Smoker     Packs/day: 0.25   • Smokeless tobacco: Never Used   Substance Use Topics   • Alcohol use: No   • Drug use: No     Social History     Patient does not qualify to have social determinant information on file (likely too young).   Social History Narrative   • Not on file       Family History   Problem Relation Age of Onset   • Cancer Mother         lung   • Cancer Father         colon   • Lung Cancer Father    • Heart Disease Sister    • Diabetes Sister         ROS:     - Constitutional:  Negative for fever, chills, unexpected weight change, and fatigue/generalized weakness.    - HEENT:  Negative for headaches, vision changes, hearing changes, ear pain, ear discharge, rhinorrhea, sinus congestion, sore throat, and neck pain.      - Respiratory: Negative for cough, sputum production, chest congestion, dyspnea, wheezing, and crackles.      - Cardiovascular: Negative for chest pain, palpitations, orthopnea, and bilateral lower extremity edema.     - Gastrointestinal: Negative for heartburn, nausea, vomiting, abdominal pain, hematochezia, melena, diarrhea, constipation, and greasy/foul-smelling stools.     - Genitourinary: Negative for dysuria, polyuria, hematuria, pyuria, urinary urgency, and urinary incontinence.     - Musculoskeletal: Negative for myalgias, back pain, and joint pain.     - Skin: Negative for rash, itching, cyanotic skin color  "change.     - Neurological: Negative for dizziness, tingling, tremors, focal sensory deficit, focal weakness and headaches.     - Endo/Heme/Allergies: Does not bruise/bleed easily.     - Psychiatric/Behavioral: Negative for depression, suicidal/homicidal ideation and memory loss.          - NOTE: All other systems reviewed and are negative, except as in HPI.      Exam:    /64 (BP Location: Left arm, Patient Position: Sitting, BP Cuff Size: Adult)   Pulse 73   Temp 36.8 °C (98.3 °F) (Temporal)   Resp 14   Ht 1.549 m (5' 1\")   Wt 65.5 kg (144 lb 6.4 oz)   SpO2 96%  Body mass index is 27.28 kg/m².    General:  Well nourished, well developed female in NAD  Head is grossly normal.  Neck: Supple without JVD or bruit. Thyroid is not enlarged.  Pulmonary: Clear to ausculation and percussion.  Normal effort. No rales, ronchi, or wheezing.  Cardiovascular: Regular rate and rhythm without murmur. Carotid and radial pulses are intact and equal bilaterally.  Extremities: no clubbing, cyanosis, or edema.  LABS: 1/6/2020: Results reviewed and discussed with the patient, questions answered.    Please note that this dictation was created using voice recognition software. I have made every reasonable attempt to correct obvious errors, but I expect that there are errors of grammar and possibly content that I did not discover before finalizing the note.    Assessment/Plan:  1. Coronary artery disease involving native coronary artery of native heart without angina pectoris  Stable, continue with current medications.  Patient encouraged to quit smoking.  She only smokes 2 to 3 cigarettes/week but any smoking is not helpful.    2. Essential hypertension  Controlled, continue with current meds and lifestyle.      3. Hypercholesterolemia  Controlled, continue with current meds and lifestyle.    - Comp Metabolic Panel; Future  - Lipid Profile; Future    4. Prediabetes  Controlled, continue with current meds and lifestyle.    - " HEMOGLOBIN A1C; Future

## 2020-01-13 NOTE — ASSESSMENT & PLAN NOTE
This is a chronic health problem for this patient that it has done well since she had a triple bypass in 1999.  She is doing secondary prevention with everything.  She will continue on her daily 81 mg aspirin.

## 2020-01-13 NOTE — ASSESSMENT & PLAN NOTE
This is a chronic health problem that is well controlled with current medications and lifestyle measures.  Patient's fasting glucose was 112 and her A1c is down to 6.2.  She will continue with her metformin 1000 mg daily.

## 2020-01-13 NOTE — ASSESSMENT & PLAN NOTE
Chronic health problem for this patient well-controlled with current meds.  BP is excellent at 116/64.  Weight is down to 144.

## 2020-02-19 ENCOUNTER — TELEPHONE (OUTPATIENT)
Dept: MEDICAL GROUP | Facility: MEDICAL CENTER | Age: 80
End: 2020-02-19

## 2020-02-19 DIAGNOSIS — Z12.11 SCREENING FOR COLORECTAL CANCER: ICD-10-CM

## 2020-02-19 DIAGNOSIS — Z12.12 SCREENING FOR COLORECTAL CANCER: ICD-10-CM

## 2020-02-21 ENCOUNTER — HOSPITAL ENCOUNTER (OUTPATIENT)
Facility: MEDICAL CENTER | Age: 80
End: 2020-02-21
Attending: FAMILY MEDICINE
Payer: MEDICARE

## 2020-02-21 PROCEDURE — 82274 ASSAY TEST FOR BLOOD FECAL: CPT

## 2020-02-25 DIAGNOSIS — Z12.12 SCREENING FOR COLORECTAL CANCER: ICD-10-CM

## 2020-02-25 DIAGNOSIS — Z12.11 SCREENING FOR COLORECTAL CANCER: ICD-10-CM

## 2020-02-26 LAB — HEMOCCULT STL QL IA: NEGATIVE

## 2020-03-26 RX ORDER — LISINOPRIL 10 MG/1
TABLET ORAL
Qty: 90 TAB | Refills: 3 | Status: SHIPPED | OUTPATIENT
Start: 2020-03-26 | End: 2020-11-16 | Stop reason: SDUPTHER

## 2020-04-13 RX ORDER — HYDROCHLOROTHIAZIDE 25 MG/1
TABLET ORAL
Qty: 90 TAB | Refills: 3 | Status: SHIPPED | OUTPATIENT
Start: 2020-04-13 | End: 2020-11-16 | Stop reason: SDUPTHER

## 2020-04-13 RX ORDER — ATORVASTATIN CALCIUM 80 MG/1
TABLET, FILM COATED ORAL
Qty: 90 TAB | Refills: 3 | Status: SHIPPED | OUTPATIENT
Start: 2020-04-13 | End: 2020-11-16 | Stop reason: SDUPTHER

## 2020-04-24 ENCOUNTER — OFFICE VISIT (OUTPATIENT)
Dept: URGENT CARE | Facility: CLINIC | Age: 80
End: 2020-04-24
Payer: MEDICARE

## 2020-04-24 VITALS
WEIGHT: 141 LBS | BODY MASS INDEX: 26.62 KG/M2 | RESPIRATION RATE: 12 BRPM | OXYGEN SATURATION: 94 % | HEIGHT: 61 IN | HEART RATE: 94 BPM | DIASTOLIC BLOOD PRESSURE: 72 MMHG | TEMPERATURE: 98.7 F | SYSTOLIC BLOOD PRESSURE: 132 MMHG

## 2020-04-24 DIAGNOSIS — B02.9 HERPES ZOSTER WITHOUT COMPLICATION: ICD-10-CM

## 2020-04-24 PROCEDURE — 99203 OFFICE O/P NEW LOW 30 MIN: CPT | Performed by: INTERNAL MEDICINE

## 2020-04-24 RX ORDER — VALACYCLOVIR HYDROCHLORIDE 1 G/1
1000 TABLET, FILM COATED ORAL 3 TIMES DAILY
Qty: 21 TAB | Refills: 0 | Status: SHIPPED | OUTPATIENT
Start: 2020-04-24 | End: 2020-05-01

## 2020-04-24 ASSESSMENT — ENCOUNTER SYMPTOMS
VOMITING: 0
SORE THROAT: 0
FEVER: 0
COUGH: 0
SHORTNESS OF BREATH: 0
FATIGUE: 0

## 2020-04-24 NOTE — PROGRESS NOTES
Subjective:     Pam Adame is a 79 y.o. female who presents for Rash (red, painful, started on left chest yesterday, spread to left upper back today AM )       Rash   This is a new problem. The current episode started yesterday. The problem has been gradually worsening since onset. The affected locations include the chest. The rash is characterized by blistering and redness. She was exposed to nothing. Pertinent negatives include no congestion, cough, fatigue, fever, shortness of breath, sore throat or vomiting.     Past Medical History:   Diagnosis Date   • Coronary artery disease involving native coronary artery of native heart without angina pectoris 9/14/2017    Done in 1999, Triple bypass   • Essential hypertension 9/14/2017   • Hypercholesterolemia 9/14/2017   • Postablative hypothyroidism 9/14/2017    Received iodine at age 34 and has been on thyroid replacement since.   • Prediabetes 9/14/2017    Controlled with Glucophage   • Tobacco use disorder, mild, abuse 9/14/2017    Smokes 0.25 pack per day     Past Surgical History:   Procedure Laterality Date   • CORONARY ARTERY BYPASS, 3  11/2000   • HYSTERECTOMY, VAGINAL     • TONSILLECTOMY AND ADENOIDECTOMY       Social History     Socioeconomic History   • Marital status:      Spouse name: Not on file   • Number of children: Not on file   • Years of education: Not on file   • Highest education level: Not on file   Occupational History   • Not on file   Social Needs   • Financial resource strain: Not on file   • Food insecurity     Worry: Not on file     Inability: Not on file   • Transportation needs     Medical: Not on file     Non-medical: Not on file   Tobacco Use   • Smoking status: Current Some Day Smoker     Packs/day: 0.25   • Smokeless tobacco: Never Used   Substance and Sexual Activity   • Alcohol use: No   • Drug use: No   • Sexual activity: Never     Partners: Male     Comment: , retired    Lifestyle   • Physical  "activity     Days per week: Not on file     Minutes per session: Not on file   • Stress: Not on file   Relationships   • Social connections     Talks on phone: Not on file     Gets together: Not on file     Attends [employer] service: Not on file     Active member of club or organization: Not on file     Attends meetings of clubs or organizations: Not on file     Relationship status: Not on file   • Intimate partner violence     Fear of current or ex partner: Not on file     Emotionally abused: Not on file     Physically abused: Not on file     Forced sexual activity: Not on file   Other Topics Concern   • Not on file   Social History Narrative   • Not on file      Family History   Problem Relation Age of Onset   • Cancer Mother         lung   • Cancer Father         colon   • Lung Cancer Father    • Heart Disease Sister    • Diabetes Sister     Review of Systems   Constitutional: Negative for fatigue and fever.   HENT: Negative for congestion and sore throat.    Respiratory: Negative for cough and shortness of breath.    Gastrointestinal: Negative for vomiting.   Skin: Positive for rash.   All other systems reviewed and are negative.  No Known Allergies   Objective:   /72   Pulse 94   Temp 37.1 °C (98.7 °F) (Temporal)   Resp 12   Ht 1.549 m (5' 1\")   Wt 64 kg (141 lb)   SpO2 94%   BMI 26.64 kg/m²   Physical Exam  Constitutional:       General: She is not in acute distress.     Appearance: She is well-developed.   HENT:      Head: Normocephalic and atraumatic.      Mouth/Throat:      Mouth: Mucous membranes are moist.      Pharynx: Oropharynx is clear.   Eyes:      Conjunctiva/sclera: Conjunctivae normal.   Neck:      Musculoskeletal: No neck rigidity.   Cardiovascular:      Rate and Rhythm: Normal rate and regular rhythm.   Pulmonary:      Effort: Pulmonary effort is normal. No respiratory distress.      Breath sounds: Normal breath sounds.   Lymphadenopathy:      Cervical: No cervical adenopathy. "   Skin:     General: Skin is warm and dry.      Capillary Refill: Capillary refill takes less than 2 seconds.      Findings: Rash (erythematous vesicular rash on left side of chest) present.   Neurological:      Mental Status: She is alert and oriented to person, place, and time.      Sensory: No sensory deficit.      Deep Tendon Reflexes: Reflexes are normal and symmetric.   Psychiatric:         Mood and Affect: Mood normal.         Behavior: Behavior normal.           Assessment/Plan:   Assessment    1. Herpes zoster without complication  - valacyclovir (VALTREX) 1 GM Tab; Take 1 Tab by mouth 3 times a day for 7 days.  Dispense: 21 Tab; Refill: 0    Other orders  - Multiple Vitamins-Minerals (HAIR SKIN AND NAILS FORMULA PO); Take  by mouth.      Differential diagnosis, natural history, supportive care, and indications for immediate follow-up discussed.

## 2020-04-30 ENCOUNTER — PATIENT MESSAGE (OUTPATIENT)
Dept: MEDICAL GROUP | Facility: PHYSICIAN GROUP | Age: 80
End: 2020-04-30

## 2020-05-07 ENCOUNTER — HOSPITAL ENCOUNTER (OUTPATIENT)
Dept: LAB | Facility: MEDICAL CENTER | Age: 80
End: 2020-05-07
Attending: FAMILY MEDICINE
Payer: MEDICARE

## 2020-05-07 DIAGNOSIS — R73.03 PREDIABETES: ICD-10-CM

## 2020-05-07 DIAGNOSIS — E78.00 HYPERCHOLESTEROLEMIA: ICD-10-CM

## 2020-05-07 LAB
ALBUMIN SERPL BCP-MCNC: 4.2 G/DL (ref 3.2–4.9)
ALBUMIN/GLOB SERPL: 1.6 G/DL
ALP SERPL-CCNC: 71 U/L (ref 30–99)
ALT SERPL-CCNC: 12 U/L (ref 2–50)
ANION GAP SERPL CALC-SCNC: 11 MMOL/L (ref 7–16)
AST SERPL-CCNC: 16 U/L (ref 12–45)
BILIRUB SERPL-MCNC: 0.6 MG/DL (ref 0.1–1.5)
BUN SERPL-MCNC: 22 MG/DL (ref 8–22)
CALCIUM SERPL-MCNC: 9.2 MG/DL (ref 8.4–10.2)
CHLORIDE SERPL-SCNC: 98 MMOL/L (ref 96–112)
CHOLEST SERPL-MCNC: 152 MG/DL (ref 100–199)
CO2 SERPL-SCNC: 25 MMOL/L (ref 20–33)
CREAT SERPL-MCNC: 0.73 MG/DL (ref 0.5–1.4)
EST. AVERAGE GLUCOSE BLD GHB EST-MCNC: 128 MG/DL
FASTING STATUS PATIENT QL REPORTED: NORMAL
GLOBULIN SER CALC-MCNC: 2.7 G/DL (ref 1.9–3.5)
GLUCOSE SERPL-MCNC: 109 MG/DL (ref 65–99)
HBA1C MFR BLD: 6.1 % (ref 0–5.6)
HDLC SERPL-MCNC: 57 MG/DL
LDLC SERPL CALC-MCNC: 73 MG/DL
POTASSIUM SERPL-SCNC: 4.4 MMOL/L (ref 3.6–5.5)
PROT SERPL-MCNC: 6.9 G/DL (ref 6–8.2)
SODIUM SERPL-SCNC: 134 MMOL/L (ref 135–145)
TRIGL SERPL-MCNC: 109 MG/DL (ref 0–149)

## 2020-05-07 PROCEDURE — 80061 LIPID PANEL: CPT

## 2020-05-07 PROCEDURE — 80053 COMPREHEN METABOLIC PANEL: CPT

## 2020-05-07 PROCEDURE — 36415 COLL VENOUS BLD VENIPUNCTURE: CPT

## 2020-05-07 PROCEDURE — 83036 HEMOGLOBIN GLYCOSYLATED A1C: CPT | Mod: GA

## 2020-05-13 ENCOUNTER — OFFICE VISIT (OUTPATIENT)
Dept: MEDICAL GROUP | Facility: PHYSICIAN GROUP | Age: 80
End: 2020-05-13
Payer: MEDICARE

## 2020-05-13 VITALS
WEIGHT: 147 LBS | HEART RATE: 78 BPM | TEMPERATURE: 98.2 F | BODY MASS INDEX: 27.75 KG/M2 | RESPIRATION RATE: 16 BRPM | OXYGEN SATURATION: 92 % | SYSTOLIC BLOOD PRESSURE: 130 MMHG | DIASTOLIC BLOOD PRESSURE: 80 MMHG | HEIGHT: 61 IN

## 2020-05-13 DIAGNOSIS — I10 ESSENTIAL HYPERTENSION: ICD-10-CM

## 2020-05-13 DIAGNOSIS — E78.00 HYPERCHOLESTEROLEMIA: ICD-10-CM

## 2020-05-13 DIAGNOSIS — R73.03 PREDIABETES: ICD-10-CM

## 2020-05-13 PROCEDURE — 99214 OFFICE O/P EST MOD 30 MIN: CPT | Performed by: FAMILY MEDICINE

## 2020-05-13 NOTE — ASSESSMENT & PLAN NOTE
This is a chronic health problem that is well controlled with current medications and lifestyle measures. Her total cholesterol is down to 152, triglycerides are down to 109, HDL good at 57 and LDL is down to 73.  There is no liver dysfunction.  We will have her continue with same meds.

## 2020-05-13 NOTE — ASSESSMENT & PLAN NOTE
This is a chronic health problem well-controlled with metformin 1000 mg daily.  Her fasting glucose is lower at 109 and her A1c is down to 6.1.  She is frustrated because she like everyone else is gained a few pounds with the [place] crisis.

## 2020-05-13 NOTE — PROGRESS NOTES
CC:Diagnoses of Essential hypertension, Prediabetes, and Hypercholesterolemia were pertinent to this visit.    HISTORY OF PRESENT ILLNESS: Patient is a 79 y.o. female established patient who presents today to talk about her chronic health problems as outlined below.      Essential hypertension  This is a chronic health problem that is well controlled with current medications and lifestyle measures. Recheck on her BP shows that it is well controlled at 130/70    Prediabetes  This is a chronic health problem well-controlled with metformin 1000 mg daily.  Her fasting glucose is lower at 109 and her A1c is down to 6.1.  She is frustrated because she like everyone else is gained a few pounds with the Corona crisis.    Hypercholesterolemia  This is a chronic health problem that is well controlled with current medications and lifestyle measures. Her total cholesterol is down to 152, triglycerides are down to 109, HDL good at 57 and LDL is down to 73.  There is no liver dysfunction.  We will have her continue with same meds.      Patient Active Problem List    Diagnosis Date Noted   • Coronary artery disease involving native coronary artery of native heart without angina pectoris 09/14/2017   • Postablative hypothyroidism 09/14/2017   • Hypercholesterolemia 09/14/2017   • Tobacco use disorder, mild, abuse 09/14/2017   • Prediabetes 09/14/2017   • Essential hypertension 09/14/2017      Allergies:Patient has no known allergies.    Current Outpatient Medications   Medication Sig Dispense Refill   • Multiple Vitamins-Minerals (HAIR SKIN AND NAILS FORMULA PO) Take  by mouth.     • hydroCHLOROthiazide (HYDRODIURIL) 25 MG Tab TAKE 1 TABLET DAILY 90 Tab 3   • atorvastatin (LIPITOR) 80 MG tablet TAKE 1 TABLET DAILY 90 Tab 3   • lisinopril (PRINIVIL) 10 MG Tab TAKE 1 TABLET DAILY 90 Tab 3   • metformin (GLUCOPHAGE) 1000 MG tablet TAKE 1 TABLET TWICE A DAY WITH MEALS 180 Tab 3   • levothyroxine (SYNTHROID) 100 MCG Tab TAKE 1 TABLET  EVERY MORNING ON AN EMPTY STOMACH 90 Tab 4   • aspirin (ASA) 81 MG Chew Tab chewable tablet Take 81 mg by mouth every day.       No current facility-administered medications for this visit.        Social History     Tobacco Use   • Smoking status: Current Some Day Smoker     Packs/day: 0.25   • Smokeless tobacco: Never Used   Substance Use Topics   • Alcohol use: No   • Drug use: No     Social History     Social History Narrative   • Not on file       Family History   Problem Relation Age of Onset   • Cancer Mother         lung   • Cancer Father         colon   • Lung Cancer Father    • Heart Disease Sister    • Diabetes Sister         ROS:     - Constitutional:  Negative for fever, chills, unexpected weight change, and fatigue/generalized weakness.    - HEENT:  Negative for headaches, vision changes, hearing changes, ear pain, ear discharge, rhinorrhea, sinus congestion, sore throat, and neck pain.      - Respiratory: Negative for cough, sputum production, chest congestion, dyspnea, wheezing, and crackles.      - Cardiovascular: Negative for chest pain, palpitations, orthopnea, and bilateral lower extremity edema.     - Gastrointestinal: Negative for heartburn, nausea, vomiting, abdominal pain, hematochezia, melena, diarrhea, constipation, and greasy/foul-smelling stools.     - Genitourinary: Negative for dysuria, polyuria, hematuria, pyuria, urinary urgency, and urinary incontinence.     - Musculoskeletal: Negative for myalgias, back pain, and joint pain.     - Skin: Negative for rash, itching, cyanotic skin color change.     - Neurological: Negative for dizziness, tingling, tremors, focal sensory deficit, focal weakness and headaches.     - Endo/Heme/Allergies: Does not bruise/bleed easily.     - Psychiatric/Behavioral: Negative for depression, suicidal/homicidal ideation and memory loss.          - NOTE: All other systems reviewed and are negative, except as in HPI.      Exam:    /80   Pulse 78   Temp  "36.8 °C (98.2 °F) (Temporal)   Resp 16   Ht 1.549 m (5' 1\")   Wt 66.7 kg (147 lb)   SpO2 92%  Body mass index is 27.78 kg/m².    General:  Well nourished, well developed female in NAD  Head is grossly normal.  Neck: Supple without JVD or bruit. Thyroid is not enlarged.  Pulmonary: Clear to ausculation and percussion.  Normal effort. No rales, ronchi, or wheezing.  Cardiovascular: Regular rate and rhythm without murmur. Carotid and radial pulses are intact and equal bilaterally.  Extremities: no clubbing, cyanosis, or edema.  LABS: 5/7/2020: Results reviewed and discussed with the patient, questions answered.    Please note that this dictation was created using voice recognition software. I have made every reasonable attempt to correct obvious errors, but I expect that there are errors of grammar and possibly content that I did not discover before finalizing the note.    Assessment/Plan:  1. Essential hypertension  Controlled, continue with current meds and lifestyle.      2. Prediabetes  Controlled, continue with current meds and lifestyle.      3. Hypercholesterolemia  Controlled, continue with current meds and lifestyle.  Then will be to recheck things in 4 months with labs prior to the visit.         "

## 2020-05-13 NOTE — ASSESSMENT & PLAN NOTE
This is a chronic health problem that is well controlled with current medications and lifestyle measures. Recheck on her BP shows that it is well controlled at 130/70

## 2020-09-30 ENCOUNTER — HOSPITAL ENCOUNTER (OUTPATIENT)
Dept: LAB | Facility: MEDICAL CENTER | Age: 80
End: 2020-09-30
Attending: FAMILY MEDICINE
Payer: MEDICARE

## 2020-09-30 DIAGNOSIS — E78.00 HYPERCHOLESTEROLEMIA: ICD-10-CM

## 2020-09-30 DIAGNOSIS — R73.03 PREDIABETES: ICD-10-CM

## 2020-09-30 LAB
EST. AVERAGE GLUCOSE BLD GHB EST-MCNC: 131 MG/DL
HBA1C MFR BLD: 6.2 % (ref 0–5.6)

## 2020-09-30 PROCEDURE — 80061 LIPID PANEL: CPT

## 2020-09-30 PROCEDURE — 80053 COMPREHEN METABOLIC PANEL: CPT

## 2020-09-30 PROCEDURE — 83036 HEMOGLOBIN GLYCOSYLATED A1C: CPT | Mod: GA

## 2020-09-30 PROCEDURE — 36415 COLL VENOUS BLD VENIPUNCTURE: CPT

## 2020-10-01 LAB
ALBUMIN SERPL BCP-MCNC: 4.1 G/DL (ref 3.2–4.9)
ALBUMIN/GLOB SERPL: 1.6 G/DL
ALP SERPL-CCNC: 81 U/L (ref 30–99)
ALT SERPL-CCNC: 13 U/L (ref 2–50)
ANION GAP SERPL CALC-SCNC: 13 MMOL/L (ref 7–16)
AST SERPL-CCNC: 14 U/L (ref 12–45)
BILIRUB SERPL-MCNC: 0.3 MG/DL (ref 0.1–1.5)
BUN SERPL-MCNC: 22 MG/DL (ref 8–22)
CALCIUM SERPL-MCNC: 9.5 MG/DL (ref 8.5–10.5)
CHLORIDE SERPL-SCNC: 99 MMOL/L (ref 96–112)
CHOLEST SERPL-MCNC: 155 MG/DL (ref 100–199)
CO2 SERPL-SCNC: 22 MMOL/L (ref 20–33)
CREAT SERPL-MCNC: 0.71 MG/DL (ref 0.5–1.4)
FASTING STATUS PATIENT QL REPORTED: NORMAL
GLOBULIN SER CALC-MCNC: 2.6 G/DL (ref 1.9–3.5)
GLUCOSE SERPL-MCNC: 101 MG/DL (ref 65–99)
HDLC SERPL-MCNC: 57 MG/DL
LDLC SERPL CALC-MCNC: 75 MG/DL
POTASSIUM SERPL-SCNC: 4.5 MMOL/L (ref 3.6–5.5)
PROT SERPL-MCNC: 6.7 G/DL (ref 6–8.2)
SODIUM SERPL-SCNC: 134 MMOL/L (ref 135–145)
TRIGL SERPL-MCNC: 113 MG/DL (ref 0–149)

## 2020-10-08 ENCOUNTER — OFFICE VISIT (OUTPATIENT)
Dept: MEDICAL GROUP | Facility: PHYSICIAN GROUP | Age: 80
End: 2020-10-08
Payer: MEDICARE

## 2020-10-08 VITALS
RESPIRATION RATE: 14 BRPM | WEIGHT: 144 LBS | SYSTOLIC BLOOD PRESSURE: 120 MMHG | BODY MASS INDEX: 27.19 KG/M2 | DIASTOLIC BLOOD PRESSURE: 72 MMHG | HEART RATE: 78 BPM | OXYGEN SATURATION: 94 % | TEMPERATURE: 97.3 F | HEIGHT: 61 IN

## 2020-10-08 DIAGNOSIS — E78.00 HYPERCHOLESTEROLEMIA: ICD-10-CM

## 2020-10-08 DIAGNOSIS — I10 ESSENTIAL HYPERTENSION: ICD-10-CM

## 2020-10-08 DIAGNOSIS — Z23 NEED FOR VACCINATION: ICD-10-CM

## 2020-10-08 DIAGNOSIS — R73.03 PREDIABETES: ICD-10-CM

## 2020-10-08 PROCEDURE — 90662 IIV NO PRSV INCREASED AG IM: CPT | Performed by: FAMILY MEDICINE

## 2020-10-08 PROCEDURE — 99214 OFFICE O/P EST MOD 30 MIN: CPT | Mod: 25 | Performed by: FAMILY MEDICINE

## 2020-10-08 PROCEDURE — G0008 ADMIN INFLUENZA VIRUS VAC: HCPCS | Performed by: FAMILY MEDICINE

## 2020-10-08 NOTE — ASSESSMENT & PLAN NOTE
This is a chronic health problem that is well controlled with current medications and lifestyle measures.  She continues on atorvastatin 80 mg daily. Her total cholesterol is good at 155, triglycerides 113, hdl good at 57 and LDL good at 75.

## 2020-10-08 NOTE — ASSESSMENT & PLAN NOTE
This is a chronic health problem that is well controlled with current medications and lifestyle measures. Her BP is excellent 120/72.

## 2020-10-08 NOTE — PROGRESS NOTES
CC:Diagnoses of Prediabetes, Essential hypertension, Hypercholesterolemia, and Need for vaccination were pertinent to this visit.    HISTORY OF PRESENT ILLNESS: Patient is a 79 y.o. female established patient who presents today to talk about her chronic health problems and to review the health of her  who is attending with her.  She did do her lab work on 9/30/2020 and we reviewed those results.      Prediabetes  This is a chronic health problem that is well controlled with current medications and lifestyle measures. Her Glucose is great at 101.    Essential hypertension  This is a chronic health problem that is well controlled with current medications and lifestyle measures. Her BP is excellent 120/72.    Hypercholesterolemia  This is a chronic health problem that is well controlled with current medications and lifestyle measures.  She continues on atorvastatin 80 mg daily. Her total cholesterol is good at 155, triglycerides 113, hdl good at 57 and LDL good at 75.      Patient Active Problem List    Diagnosis Date Noted   • Coronary artery disease involving native coronary artery of native heart without angina pectoris 09/14/2017   • Postablative hypothyroidism 09/14/2017   • Hypercholesterolemia 09/14/2017   • Tobacco use disorder, mild, abuse 09/14/2017   • Prediabetes 09/14/2017   • Essential hypertension 09/14/2017      Allergies:Patient has no known allergies.    Current Outpatient Medications   Medication Sig Dispense Refill   • Multiple Vitamins-Minerals (HAIR SKIN AND NAILS FORMULA PO) Take  by mouth.     • hydroCHLOROthiazide (HYDRODIURIL) 25 MG Tab TAKE 1 TABLET DAILY 90 Tab 3   • atorvastatin (LIPITOR) 80 MG tablet TAKE 1 TABLET DAILY 90 Tab 3   • lisinopril (PRINIVIL) 10 MG Tab TAKE 1 TABLET DAILY 90 Tab 3   • metformin (GLUCOPHAGE) 1000 MG tablet TAKE 1 TABLET TWICE A DAY WITH MEALS 180 Tab 3   • levothyroxine (SYNTHROID) 100 MCG Tab TAKE 1 TABLET EVERY MORNING ON AN EMPTY STOMACH 90 Tab 4   •  aspirin (ASA) 81 MG Chew Tab chewable tablet Take 81 mg by mouth every day.       No current facility-administered medications for this visit.        Social History     Tobacco Use   • Smoking status: Current Some Day Smoker     Packs/day: 0.25   • Smokeless tobacco: Never Used   Substance Use Topics   • Alcohol use: No   • Drug use: No     Social History     Social History Narrative   • Not on file       Family History   Problem Relation Age of Onset   • Cancer Mother         lung   • Cancer Father         colon   • Lung Cancer Father    • Heart Disease Sister    • Diabetes Sister         ROS:     - Constitutional:  Negative for fever, chills, unexpected weight change, and fatigue/generalized weakness.    - HEENT:  Negative for headaches, vision changes, hearing changes, ear pain, ear discharge, rhinorrhea, sinus congestion, sore throat, and neck pain.      - Respiratory: Negative for cough, sputum production, chest congestion, dyspnea, wheezing, and crackles.      - Cardiovascular: Negative for chest pain, palpitations, orthopnea, and bilateral lower extremity edema.     - Gastrointestinal: Negative for heartburn, nausea, vomiting, abdominal pain, hematochezia, melena, diarrhea, constipation, and greasy/foul-smelling stools.     - Genitourinary: Negative for dysuria, polyuria, hematuria, pyuria, urinary urgency, and urinary incontinence.     - Musculoskeletal: Negative for myalgias, back pain, and joint pain.     - Skin: Negative for rash, itching, cyanotic skin color change.     - Neurological: Negative for dizziness, tingling, tremors, focal sensory deficit, focal weakness and headaches.     - Endo/Heme/Allergies: Does not bruise/bleed easily.     - Psychiatric/Behavioral: Negative for depression, suicidal/homicidal ideation and memory loss.          - NOTE: All other systems reviewed and are negative, except as in HPI.      Exam:    /72 (BP Location: Right arm, Patient Position: Sitting, BP Cuff Size:  "Adult)   Pulse 78   Temp 36.3 °C (97.3 °F) (Temporal)   Resp 14   Ht 1.549 m (5' 1\")   Wt 65.3 kg (144 lb)   SpO2 94%  Body mass index is 27.21 kg/m².    General:  Well nourished, well developed female in NAD  Head is grossly normal.  Neck: Supple without JVD or bruit. Thyroid is not enlarged.  Pulmonary: Clear to ausculation and percussion.  Normal effort. No rales, ronchi, or wheezing.  Cardiovascular: Regular rate and rhythm without murmur. Carotid and radial pulses are intact and equal bilaterally.  Extremities: no clubbing, cyanosis, or edema.  LABS: 9/30/2020: Results reviewed and discussed with the patient, questions answered.    Please note that this dictation was created using voice recognition software. I have made every reasonable attempt to correct obvious errors, but I expect that there are errors of grammar and possibly content that I did not discover before finalizing the note.    Assessment/Plan:  1. Prediabetes  Controlled, continue with current meds and lifestyle.      2. Essential hypertension  Controlled, continue with current meds and lifestyle.      3. Hypercholesterolemia  Controlled, continue with current meds and lifestyle.      4. Need for vaccination  Given today  - INFLUENZA VACCINE, HIGH DOSE (65+ ONLY)      Plan will be to see this patient back with lab work prior in approximately 6 months.  "

## 2020-10-08 NOTE — ASSESSMENT & PLAN NOTE
This is a chronic health problem that is well controlled with current medications and lifestyle measures. Her Glucose is great at 101.

## 2020-11-16 ENCOUNTER — OFFICE VISIT (OUTPATIENT)
Dept: MEDICAL GROUP | Facility: PHYSICIAN GROUP | Age: 80
End: 2020-11-16
Payer: MEDICARE

## 2020-11-16 VITALS
HEART RATE: 75 BPM | OXYGEN SATURATION: 96 % | WEIGHT: 144.2 LBS | SYSTOLIC BLOOD PRESSURE: 136 MMHG | RESPIRATION RATE: 14 BRPM | BODY MASS INDEX: 27.23 KG/M2 | DIASTOLIC BLOOD PRESSURE: 82 MMHG | HEIGHT: 61 IN | TEMPERATURE: 98.2 F

## 2020-11-16 DIAGNOSIS — R73.03 PREDIABETES: ICD-10-CM

## 2020-11-16 DIAGNOSIS — E89.0 POSTABLATIVE HYPOTHYROIDISM: ICD-10-CM

## 2020-11-16 DIAGNOSIS — I10 ESSENTIAL HYPERTENSION: ICD-10-CM

## 2020-11-16 DIAGNOSIS — E78.00 HYPERCHOLESTEROLEMIA: ICD-10-CM

## 2020-11-16 PROCEDURE — 99214 OFFICE O/P EST MOD 30 MIN: CPT | Performed by: FAMILY MEDICINE

## 2020-11-16 RX ORDER — LEVOTHYROXINE SODIUM 0.1 MG/1
TABLET ORAL
Qty: 90 TAB | Refills: 4 | Status: SHIPPED | OUTPATIENT
Start: 2020-11-16 | End: 2022-05-02 | Stop reason: SDUPTHER

## 2020-11-16 RX ORDER — HYDROCHLOROTHIAZIDE 25 MG/1
TABLET ORAL
Qty: 90 TAB | Refills: 3 | Status: SHIPPED | OUTPATIENT
Start: 2020-11-16 | End: 2022-04-04

## 2020-11-16 RX ORDER — ATORVASTATIN CALCIUM 80 MG/1
TABLET, FILM COATED ORAL
Qty: 90 TAB | Refills: 3 | Status: SHIPPED | OUTPATIENT
Start: 2020-11-16 | End: 2022-04-21

## 2020-11-16 RX ORDER — LISINOPRIL 10 MG/1
TABLET ORAL
Qty: 90 TAB | Refills: 3 | Status: SHIPPED | OUTPATIENT
Start: 2020-11-16 | End: 2022-04-21

## 2020-11-16 NOTE — PROGRESS NOTES
CC:Diagnoses of Essential hypertension, Hypercholesterolemia, Prediabetes, and Postablative hypothyroidism were pertinent to this visit.    HISTORY OF PRESENT ILLNESS: Patient is a 79 y.o. female established patient who presents today to talk about her chronic health problems and follow-up on everything prior to her having her DMV form signed.  Patient also informs me that her 's eyesight has gotten so bad that she needs to have a DMV placard for him.      Essential hypertension  This is a chronic health problem for this patient who needs paperwork completed for the DMV.  She does have hypertension but it is adequately controlled with current meds.  Blood pressure today 136/82.    Hypercholesterolemia  This is a chronic health problem that is well controlled with current medications and lifestyle measures.    Prediabetes  This is a chronic health problem that is well controlled with current medications and lifestyle measures.    Postablative hypothyroidism  This is a chronic health problem that is well controlled with current medications and lifestyle measures.      Patient Active Problem List    Diagnosis Date Noted   • Coronary artery disease involving native coronary artery of native heart without angina pectoris 09/14/2017   • Postablative hypothyroidism 09/14/2017   • Hypercholesterolemia 09/14/2017   • Tobacco use disorder, mild, abuse 09/14/2017   • Prediabetes 09/14/2017   • Essential hypertension 09/14/2017      Allergies:Patient has no known allergies.    Current Outpatient Medications   Medication Sig Dispense Refill   • atorvastatin (LIPITOR) 80 MG tablet TAKE 1 TABLET DAILY 90 Tab 3   • hydroCHLOROthiazide (HYDRODIURIL) 25 MG Tab TAKE 1 TABLET DAILY 90 Tab 3   • levothyroxine (SYNTHROID) 100 MCG Tab TAKE 1 TABLET EVERY MORNING ON AN EMPTY STOMACH 90 Tab 4   • lisinopril (PRINIVIL) 10 MG Tab TAKE 1 TABLET DAILY 90 Tab 3   • metformin (GLUCOPHAGE) 1000 MG tablet Take 1 Tab by mouth 2 times a day,  with meals. 180 Tab 3   • Multiple Vitamins-Minerals (HAIR SKIN AND NAILS FORMULA PO) Take  by mouth.     • aspirin (ASA) 81 MG Chew Tab chewable tablet Take 81 mg by mouth every day.       No current facility-administered medications for this visit.        Social History     Tobacco Use   • Smoking status: Current Some Day Smoker     Packs/day: 0.25   • Smokeless tobacco: Never Used   Substance Use Topics   • Alcohol use: No   • Drug use: No     Social History     Social History Narrative   • Not on file       Family History   Problem Relation Age of Onset   • Cancer Mother         lung   • Cancer Father         colon   • Lung Cancer Father    • Heart Disease Sister    • Diabetes Sister         ROS:     - Constitutional:  Negative for fever, chills, unexpected weight change, and fatigue/generalized weakness.    - HEENT:  Negative for headaches, vision changes, hearing changes, ear pain, ear discharge, rhinorrhea, sinus congestion, sore throat, and neck pain.      - Respiratory: Negative for cough, sputum production, chest congestion, dyspnea, wheezing, and crackles.      - Cardiovascular: Negative for chest pain, palpitations, orthopnea, and bilateral lower extremity edema.     - Gastrointestinal: Negative for heartburn, nausea, vomiting, abdominal pain, hematochezia, melena, diarrhea, constipation, and greasy/foul-smelling stools.     - Genitourinary: Negative for dysuria, polyuria, hematuria, pyuria, urinary urgency, and urinary incontinence.     - Musculoskeletal: Negative for myalgias, back pain, and joint pain.     - Skin: Negative for rash, itching, cyanotic skin color change.     - Neurological: Negative for dizziness, tingling, tremors, focal sensory deficit, focal weakness and headaches.     - Endo/Heme/Allergies: Does not bruise/bleed easily.     - Psychiatric/Behavioral: Negative for depression, suicidal/homicidal ideation and memory loss.          - NOTE: All other systems reviewed and are negative,  "except as in HPI.      Exam:    /82 (BP Location: Left arm, Patient Position: Sitting, BP Cuff Size: Adult)   Pulse 75   Temp 36.8 °C (98.2 °F) (Temporal)   Resp 14   Ht 1.549 m (5' 1\")   Wt 65.4 kg (144 lb 3.2 oz)   SpO2 96%  Body mass index is 27.25 kg/m².    General:  Well nourished, well developed female in NAD  Head is grossly normal.  Neck: Supple without JVD or bruit. Thyroid is not enlarged.  Pulmonary: Clear to ausculation and percussion.  Normal effort. No rales, ronchi, or wheezing.  Cardiovascular: Regular rate and rhythm without murmur. Carotid and radial pulses are intact and equal bilaterally.  Extremities: no clubbing, cyanosis, or edema.    Please note that this dictation was created using voice recognition software. I have made every reasonable attempt to correct obvious errors, but I expect that there are errors of grammar and possibly content that I did not discover before finalizing the note.    Assessment/Plan:  1. Essential hypertension  Controlled, continue with current meds and lifestyle.      2. Hypercholesterolemia  Controlled, continue with current meds and lifestyle.      3. Prediabetes  Controlled, continue with current meds and lifestyle.      4. Postablative hypothyroidism  Controlled, continue with current meds and lifestyle.           "

## 2020-11-16 NOTE — ASSESSMENT & PLAN NOTE
This is a chronic health problem for this patient who needs paperwork completed for the DMV.  She does have hypertension but it is adequately controlled with current meds.  Blood pressure today 136/82.

## 2021-01-11 DIAGNOSIS — Z23 NEED FOR VACCINATION: ICD-10-CM

## 2021-02-17 ENCOUNTER — IMMUNIZATION (OUTPATIENT)
Dept: FAMILY PLANNING/WOMEN'S HEALTH CLINIC | Facility: IMMUNIZATION CENTER | Age: 81
End: 2021-02-17
Attending: INTERNAL MEDICINE
Payer: MEDICARE

## 2021-02-17 DIAGNOSIS — Z23 ENCOUNTER FOR VACCINATION: Primary | ICD-10-CM

## 2021-02-17 DIAGNOSIS — Z23 NEED FOR VACCINATION: ICD-10-CM

## 2021-02-17 PROCEDURE — 0001A PFIZER SARS-COV-2 VACCINE: CPT

## 2021-02-17 PROCEDURE — 91300 PFIZER SARS-COV-2 VACCINE: CPT

## 2021-03-10 ENCOUNTER — IMMUNIZATION (OUTPATIENT)
Dept: FAMILY PLANNING/WOMEN'S HEALTH CLINIC | Facility: IMMUNIZATION CENTER | Age: 81
End: 2021-03-10
Attending: INTERNAL MEDICINE
Payer: MEDICARE

## 2021-03-10 DIAGNOSIS — Z23 ENCOUNTER FOR VACCINATION: Primary | ICD-10-CM

## 2021-03-10 PROCEDURE — 0002A PFIZER SARS-COV-2 VACCINE: CPT

## 2021-03-10 PROCEDURE — 91300 PFIZER SARS-COV-2 VACCINE: CPT

## 2021-08-05 ENCOUNTER — TELEPHONE (OUTPATIENT)
Dept: INTERNAL MEDICINE | Facility: IMAGING CENTER | Age: 81
End: 2021-08-05

## 2021-11-04 ENCOUNTER — HOSPITAL ENCOUNTER (OUTPATIENT)
Dept: LAB | Facility: MEDICAL CENTER | Age: 81
End: 2021-11-04
Attending: FAMILY MEDICINE
Payer: MEDICARE

## 2021-11-04 LAB
ALBUMIN SERPL BCP-MCNC: 4.4 G/DL (ref 3.2–4.9)
ALBUMIN/GLOB SERPL: 1.7 G/DL
ALP SERPL-CCNC: 90 U/L (ref 30–99)
ALT SERPL-CCNC: 9 U/L (ref 2–50)
ANION GAP SERPL CALC-SCNC: 9 MMOL/L (ref 7–16)
AST SERPL-CCNC: 15 U/L (ref 12–45)
BILIRUB SERPL-MCNC: 0.4 MG/DL (ref 0.1–1.5)
BUN SERPL-MCNC: 24 MG/DL (ref 8–22)
CALCIUM SERPL-MCNC: 9.7 MG/DL (ref 8.5–10.5)
CHLORIDE SERPL-SCNC: 104 MMOL/L (ref 96–112)
CHOLEST SERPL-MCNC: 146 MG/DL (ref 100–199)
CO2 SERPL-SCNC: 25 MMOL/L (ref 20–33)
CREAT SERPL-MCNC: 0.83 MG/DL (ref 0.5–1.4)
EST. AVERAGE GLUCOSE BLD GHB EST-MCNC: 123 MG/DL
FASTING STATUS PATIENT QL REPORTED: NORMAL
GLOBULIN SER CALC-MCNC: 2.6 G/DL (ref 1.9–3.5)
GLUCOSE SERPL-MCNC: 105 MG/DL (ref 65–99)
HBA1C MFR BLD: 5.9 % (ref 4–5.6)
HDLC SERPL-MCNC: 51 MG/DL
LDLC SERPL CALC-MCNC: 76 MG/DL
POTASSIUM SERPL-SCNC: 5.4 MMOL/L (ref 3.6–5.5)
PROT SERPL-MCNC: 7 G/DL (ref 6–8.2)
SODIUM SERPL-SCNC: 138 MMOL/L (ref 135–145)
TRIGL SERPL-MCNC: 95 MG/DL (ref 0–149)

## 2021-11-04 PROCEDURE — 36415 COLL VENOUS BLD VENIPUNCTURE: CPT

## 2021-11-04 PROCEDURE — 80061 LIPID PANEL: CPT

## 2021-11-04 PROCEDURE — 80053 COMPREHEN METABOLIC PANEL: CPT

## 2021-11-04 PROCEDURE — 83036 HEMOGLOBIN GLYCOSYLATED A1C: CPT | Mod: GA

## 2021-11-12 ENCOUNTER — OFFICE VISIT (OUTPATIENT)
Dept: INTERNAL MEDICINE | Facility: IMAGING CENTER | Age: 81
End: 2021-11-12
Payer: MEDICARE

## 2021-11-12 VITALS
RESPIRATION RATE: 16 BRPM | DIASTOLIC BLOOD PRESSURE: 64 MMHG | HEART RATE: 58 BPM | WEIGHT: 141.6 LBS | SYSTOLIC BLOOD PRESSURE: 132 MMHG | BODY MASS INDEX: 26.73 KG/M2 | TEMPERATURE: 97.7 F | OXYGEN SATURATION: 94 % | HEIGHT: 61 IN

## 2021-11-12 DIAGNOSIS — I10 ESSENTIAL HYPERTENSION: ICD-10-CM

## 2021-11-12 DIAGNOSIS — Z12.11 SCREENING FOR COLORECTAL CANCER: ICD-10-CM

## 2021-11-12 DIAGNOSIS — Z12.12 SCREENING FOR COLORECTAL CANCER: ICD-10-CM

## 2021-11-12 DIAGNOSIS — Z23 NEED FOR VACCINATION: ICD-10-CM

## 2021-11-12 DIAGNOSIS — E89.0 POSTABLATIVE HYPOTHYROIDISM: ICD-10-CM

## 2021-11-12 DIAGNOSIS — R73.03 PREDIABETES: ICD-10-CM

## 2021-11-12 DIAGNOSIS — E78.00 HYPERCHOLESTEROLEMIA: ICD-10-CM

## 2021-11-12 PROCEDURE — G0008 ADMIN INFLUENZA VIRUS VAC: HCPCS | Performed by: FAMILY MEDICINE

## 2021-11-12 PROCEDURE — 99214 OFFICE O/P EST MOD 30 MIN: CPT | Mod: 25 | Performed by: FAMILY MEDICINE

## 2021-11-12 PROCEDURE — 90662 IIV NO PRSV INCREASED AG IM: CPT | Performed by: FAMILY MEDICINE

## 2021-11-12 ASSESSMENT — PATIENT HEALTH QUESTIONNAIRE - PHQ9: CLINICAL INTERPRETATION OF PHQ2 SCORE: 0

## 2021-11-12 NOTE — ASSESSMENT & PLAN NOTE
This is a chronic health problem for this patient that is adequately controlled on lisinopril 10 mg daily.  Her blood pressure today is 132/64.  That is slightly higher than she usually runs but she also has to be responsible both for herself and her  who has multiple chronic health issues including dementia and poor eyesight so those because stressed when she has to do take him out of his usual environments.

## 2021-11-12 NOTE — ASSESSMENT & PLAN NOTE
This is a chronic health problem that is adequately controlled with current medications.  Her total cholesterol is completely in the normal range without side effects from the medication.  Total cholesterol is down to 146, triglycerides excellent at 95, HDL excellent at 51 and LDL excellent at 76.  No liver dysfunction.  She will continue with current meds long-term.

## 2021-11-12 NOTE — ASSESSMENT & PLAN NOTE
This is a chronic health problem for this patient that continues to do well and is stable with her blood sugar staying at an acceptable range.  Currently it is 105 with an A1c of 5.3.  She has been able to maintain her weight loss through dietary changes and lifestyle management.  She will continue to work on this.

## 2021-11-13 NOTE — PROGRESS NOTES
Patient being seen to reestablish care and to review her labs from 11/4/2021.      CC:   Chief Complaint   Patient presents with   • Follow-Up     lab results                                                                                                                                           HPI:   Pam presents today with the following concerns:    1. Essential hypertension  Chronic health problem well-controlled with current meds    2. Hypercholesterolemia  Chronic health problem well-controlled with current meds    3. Prediabetes  Chronic health problem that the patient has been able to manage through lifestyle and weight loss    4. Screening for colorectal cancer  Patient needing to do Cologuard    5. Need for vaccination  Patient's flu shot was provided today    6. Postablative hypothyroidism  Patient needing to have thyroid testing in the near future.       Patient Active Problem List    Diagnosis Date Noted   • Coronary artery disease involving native coronary artery of native heart without angina pectoris 09/14/2017   • Postablative hypothyroidism 09/14/2017   • Hypercholesterolemia 09/14/2017   • Tobacco use disorder, mild, abuse 09/14/2017   • Prediabetes 09/14/2017   • Essential hypertension 09/14/2017       Current Outpatient Medications   Medication Sig Dispense Refill   • atorvastatin (LIPITOR) 80 MG tablet TAKE 1 TABLET DAILY 90 Tab 3   • hydroCHLOROthiazide (HYDRODIURIL) 25 MG Tab TAKE 1 TABLET DAILY 90 Tab 3   • levothyroxine (SYNTHROID) 100 MCG Tab TAKE 1 TABLET EVERY MORNING ON AN EMPTY STOMACH 90 Tab 4   • lisinopril (PRINIVIL) 10 MG Tab TAKE 1 TABLET DAILY 90 Tab 3   • metformin (GLUCOPHAGE) 1000 MG tablet Take 1 Tab by mouth 2 times a day, with meals. 180 Tab 3   • Multiple Vitamins-Minerals (HAIR SKIN AND NAILS FORMULA PO) Take  by mouth.     • aspirin (ASA) 81 MG Chew Tab chewable tablet Take 81 mg by mouth every day.       No current facility-administered medications for this visit.  "        Allergies as of 11/12/2021   • (No Known Allergies)            /64 (BP Location: Left arm, Patient Position: Sitting, BP Cuff Size: Adult)   Pulse (!) 58   Temp 36.5 °C (97.7 °F) (Temporal)   Resp 16   Ht 1.549 m (5' 1\")   Wt 64.2 kg (141 lb 9.6 oz)   SpO2 94%   Breastfeeding No   BMI 26.76 kg/m²     Physical Exam:  Gen:         Alert and oriented, No apparent distress.  Neck:        No Lymphadenopathy or Bruits.  Lungs:     Clear to auscultation bilaterally  CV:          Regular rate and rhythm. No murmurs, rubs or gallops.               Ext:          No clubbing, cyanosis, edema.    LABS: 11/4/2021: Results reviewed and discussed with the patient, questions answered.      Assessment and Plan.   80 y.o. female with the following issues:    Essential hypertension  This is a chronic health problem for this patient that is adequately controlled on lisinopril 10 mg daily.  Her blood pressure today is 132/64.  That is slightly higher than she usually runs but she also has to be responsible both for herself and her  who has multiple chronic health issues including dementia and poor eyesight so those because stressed when she has to do take him out of his usual environments.    Hypercholesterolemia  This is a chronic health problem that is adequately controlled with current medications.  Her total cholesterol is completely in the normal range without side effects from the medication.  Total cholesterol is down to 146, triglycerides excellent at 95, HDL excellent at 51 and LDL excellent at 76.  No liver dysfunction.  She will continue with current meds long-term.    Prediabetes  This is a chronic health problem for this patient that continues to do well and is stable with her blood sugar staying at an acceptable range.  Currently it is 105 with an A1c of 5.3.  She has been able to maintain her weight loss through dietary changes and lifestyle management.  She will continue to work on " this.    Postablative hypothyroidism  This is a chronic health problem for this patient that I want to be certain we recheck a reflex TSH with her next set of labs in 3 months.

## 2021-11-13 NOTE — ASSESSMENT & PLAN NOTE
This is a chronic health problem for this patient that I want to be certain we recheck a reflex TSH with her next set of labs in 3 months.

## 2022-01-26 ENCOUNTER — PATIENT MESSAGE (OUTPATIENT)
Dept: INTERNAL MEDICINE | Facility: IMAGING CENTER | Age: 82
End: 2022-01-26

## 2022-02-03 ENCOUNTER — HOSPITAL ENCOUNTER (OUTPATIENT)
Dept: LAB | Facility: MEDICAL CENTER | Age: 82
End: 2022-02-03
Attending: FAMILY MEDICINE
Payer: MEDICARE

## 2022-02-03 DIAGNOSIS — E78.00 HYPERCHOLESTEROLEMIA: ICD-10-CM

## 2022-02-03 DIAGNOSIS — R73.03 PREDIABETES: ICD-10-CM

## 2022-02-03 DIAGNOSIS — E89.0 POSTABLATIVE HYPOTHYROIDISM: ICD-10-CM

## 2022-02-03 LAB
ALBUMIN SERPL BCP-MCNC: 4.5 G/DL (ref 3.2–4.9)
ALBUMIN/GLOB SERPL: 1.6 G/DL
ALP SERPL-CCNC: 101 U/L (ref 30–99)
ALT SERPL-CCNC: 9 U/L (ref 2–50)
ANION GAP SERPL CALC-SCNC: 12 MMOL/L (ref 7–16)
AST SERPL-CCNC: 16 U/L (ref 12–45)
BILIRUB SERPL-MCNC: 0.4 MG/DL (ref 0.1–1.5)
BUN SERPL-MCNC: 26 MG/DL (ref 8–22)
CALCIUM SERPL-MCNC: 9.7 MG/DL (ref 8.5–10.5)
CHLORIDE SERPL-SCNC: 103 MMOL/L (ref 96–112)
CHOLEST SERPL-MCNC: 180 MG/DL (ref 100–199)
CO2 SERPL-SCNC: 22 MMOL/L (ref 20–33)
CREAT SERPL-MCNC: 0.85 MG/DL (ref 0.5–1.4)
FASTING STATUS PATIENT QL REPORTED: NORMAL
GLOBULIN SER CALC-MCNC: 2.8 G/DL (ref 1.9–3.5)
GLUCOSE SERPL-MCNC: 110 MG/DL (ref 65–99)
HDLC SERPL-MCNC: 58 MG/DL
LDLC SERPL CALC-MCNC: 97 MG/DL
POTASSIUM SERPL-SCNC: 4.9 MMOL/L (ref 3.6–5.5)
PROT SERPL-MCNC: 7.3 G/DL (ref 6–8.2)
SODIUM SERPL-SCNC: 137 MMOL/L (ref 135–145)
TRIGL SERPL-MCNC: 123 MG/DL (ref 0–149)
TSH SERPL DL<=0.005 MIU/L-ACNC: 0.86 UIU/ML (ref 0.38–5.33)

## 2022-02-03 PROCEDURE — 80053 COMPREHEN METABOLIC PANEL: CPT

## 2022-02-03 PROCEDURE — 36415 COLL VENOUS BLD VENIPUNCTURE: CPT

## 2022-02-03 PROCEDURE — 80061 LIPID PANEL: CPT

## 2022-02-03 PROCEDURE — 84443 ASSAY THYROID STIM HORMONE: CPT

## 2022-02-11 ENCOUNTER — OFFICE VISIT (OUTPATIENT)
Dept: INTERNAL MEDICINE | Facility: IMAGING CENTER | Age: 82
End: 2022-02-11
Payer: MEDICARE

## 2022-02-11 VITALS
SYSTOLIC BLOOD PRESSURE: 130 MMHG | TEMPERATURE: 98.1 F | DIASTOLIC BLOOD PRESSURE: 64 MMHG | WEIGHT: 140 LBS | OXYGEN SATURATION: 97 % | BODY MASS INDEX: 26.43 KG/M2 | HEART RATE: 65 BPM | RESPIRATION RATE: 16 BRPM | HEIGHT: 61 IN

## 2022-02-11 DIAGNOSIS — R73.03 PREDIABETES: ICD-10-CM

## 2022-02-11 DIAGNOSIS — Z23 NEED FOR VACCINATION: ICD-10-CM

## 2022-02-11 DIAGNOSIS — E89.0 POSTABLATIVE HYPOTHYROIDISM: ICD-10-CM

## 2022-02-11 DIAGNOSIS — E78.00 HYPERCHOLESTEROLEMIA: ICD-10-CM

## 2022-02-11 DIAGNOSIS — I10 ESSENTIAL HYPERTENSION: ICD-10-CM

## 2022-02-11 PROCEDURE — 90471 IMMUNIZATION ADMIN: CPT | Performed by: FAMILY MEDICINE

## 2022-02-11 PROCEDURE — 99214 OFFICE O/P EST MOD 30 MIN: CPT | Mod: 25 | Performed by: FAMILY MEDICINE

## 2022-02-11 PROCEDURE — 90715 TDAP VACCINE 7 YRS/> IM: CPT | Performed by: FAMILY MEDICINE

## 2022-02-11 ASSESSMENT — PATIENT HEALTH QUESTIONNAIRE - PHQ9: CLINICAL INTERPRETATION OF PHQ2 SCORE: 0

## 2022-02-11 NOTE — ASSESSMENT & PLAN NOTE
This is a chronic health problem for which the patient is on atorvastatin 80 mg daily.  Her total cholesterol is down to 180, triglycerides are down to 123, HDL went up to 58 and LDL is down to 97.  All of this is at goal without any liver dysfunction.  We will have her continue with current meds long-term.

## 2022-02-11 NOTE — ASSESSMENT & PLAN NOTE
This is a chronic health problem for this patient that actually is in fairly good control.  Fasting glucose 110.  Her triglycerides have actually normalized.  She will continue to work on lifestyle management.

## 2022-02-11 NOTE — ASSESSMENT & PLAN NOTE
This is a chronic health problem that is well controlled with current replacement dose of levothyroxine 100 mcg daily.  Her TSH is in the normal range at 0.860 on recent labs done 2/3/22..

## 2022-02-11 NOTE — ASSESSMENT & PLAN NOTE
This is a chronic health problem for which the patient is on hydrochlorothiazide 25 mg daily.  Her blood pressure is excellent at 130/64.  Her weight loss has helped that as well.

## 2022-02-11 NOTE — PROGRESS NOTES
CC: Diagnoses of Prediabetes, Essential hypertension, Hypercholesterolemia, and Postablative hypothyroidism were pertinent to this visit.                                                                                                                                         HPI:   Pam presents today with the following concerns:    1. Prediabetes  This is a chronic health problem controlled with her weight loss and dietary choices.Her A1c is great at 5.9.    2. Essential hypertension  Chronic health problem well-controlled with current meds.  Patient also working on weight loss which has been helpful.    3. Hypercholesterolemia  Chronic health problem that made a huge improvement with her stopping her ice cream intake    4. Postablative hypothyroidism  Chronic health problem adequately controlled with current levothyroxine dosing.       Patient Active Problem List    Diagnosis Date Noted   • Coronary artery disease involving native coronary artery of native heart without angina pectoris 09/14/2017   • Postablative hypothyroidism 09/14/2017   • Hypercholesterolemia 09/14/2017   • Tobacco use disorder, mild, abuse 09/14/2017   • Prediabetes 09/14/2017   • Essential hypertension 09/14/2017       Current Outpatient Medications   Medication Sig Dispense Refill   • atorvastatin (LIPITOR) 80 MG tablet TAKE 1 TABLET DAILY 90 Tab 3   • hydroCHLOROthiazide (HYDRODIURIL) 25 MG Tab TAKE 1 TABLET DAILY 90 Tab 3   • levothyroxine (SYNTHROID) 100 MCG Tab TAKE 1 TABLET EVERY MORNING ON AN EMPTY STOMACH 90 Tab 4   • lisinopril (PRINIVIL) 10 MG Tab TAKE 1 TABLET DAILY 90 Tab 3   • metformin (GLUCOPHAGE) 1000 MG tablet Take 1 Tab by mouth 2 times a day, with meals. 180 Tab 3   • Multiple Vitamins-Minerals (HAIR SKIN AND NAILS FORMULA PO) Take  by mouth.     • aspirin (ASA) 81 MG Chew Tab chewable tablet Take 81 mg by mouth every day.       No current facility-administered medications for this visit.         Allergies as of  "02/11/2022   • (No Known Allergies)            /64 (BP Location: Left arm, Patient Position: Sitting, BP Cuff Size: Adult)   Pulse 65   Temp 36.7 °C (98.1 °F) (Temporal)   Resp 16   Ht 1.549 m (5' 1\")   Wt 63.5 kg (140 lb)   SpO2 97%   Breastfeeding No   BMI 26.45 kg/m²     Physical Exam:  Gen:         Alert and oriented, No apparent distress.  Neck:        No Lymphadenopathy or Bruits.  Lungs:     Clear to auscultation bilaterally  CV:          Regular rate and rhythm. No murmurs, rubs or gallops.               Ext:          No clubbing, cyanosis, edema.    LABS: 2/3/2022: Results reviewed and discussed with the patient, questions answered.      Assessment and Plan.   81 y.o. female with the following issues:    Prediabetes  This is a chronic health problem for this patient that actually is in fairly good control.  Fasting glucose 110.  Her triglycerides have actually normalized.  She will continue to work on lifestyle management.    Essential hypertension  This is a chronic health problem for which the patient is on hydrochlorothiazide 25 mg daily.  Her blood pressure is excellent at 130/64.  Her weight loss has helped that as well.    Hypercholesterolemia  This is a chronic health problem for which the patient is on atorvastatin 80 mg daily.  Her total cholesterol is down to 180, triglycerides are down to 123, HDL went up to 58 and LDL is down to 97.  All of this is at goal without any liver dysfunction.  We will have her continue with current meds long-term.    Postablative hypothyroidism  This is a chronic health problem that is well controlled with current replacement dose of levothyroxine 100 mcg daily.  Her TSH is in the normal range at 0.860 on recent labs done 2/3/22..       "

## 2022-04-04 RX ORDER — HYDROCHLOROTHIAZIDE 25 MG/1
TABLET ORAL
Qty: 90 TABLET | Refills: 3 | Status: SHIPPED | OUTPATIENT
Start: 2022-04-04 | End: 2023-03-31

## 2022-04-20 DIAGNOSIS — I10 ESSENTIAL HYPERTENSION: ICD-10-CM

## 2022-04-21 RX ORDER — ATORVASTATIN CALCIUM 80 MG/1
TABLET, FILM COATED ORAL
Qty: 90 TABLET | Refills: 3 | Status: SHIPPED | OUTPATIENT
Start: 2022-04-21 | End: 2023-04-18

## 2022-04-21 RX ORDER — LISINOPRIL 10 MG/1
TABLET ORAL
Qty: 90 TABLET | Refills: 3 | Status: SHIPPED | OUTPATIENT
Start: 2022-04-21 | End: 2023-04-18

## 2022-04-30 ENCOUNTER — PATIENT MESSAGE (OUTPATIENT)
Dept: INTERNAL MEDICINE | Facility: IMAGING CENTER | Age: 82
End: 2022-04-30
Payer: MEDICARE

## 2022-04-30 DIAGNOSIS — E89.0 POSTABLATIVE HYPOTHYROIDISM: ICD-10-CM

## 2022-05-02 RX ORDER — LEVOTHYROXINE SODIUM 0.1 MG/1
TABLET ORAL
Qty: 90 TABLET | Refills: 3 | Status: SHIPPED | OUTPATIENT
Start: 2022-05-02 | End: 2023-05-23

## 2022-08-05 ENCOUNTER — HOSPITAL ENCOUNTER (OUTPATIENT)
Dept: LAB | Facility: MEDICAL CENTER | Age: 82
End: 2022-08-05
Attending: FAMILY MEDICINE
Payer: MEDICARE

## 2022-08-05 DIAGNOSIS — E78.00 HYPERCHOLESTEROLEMIA: ICD-10-CM

## 2022-08-05 LAB
ALBUMIN SERPL BCP-MCNC: 4.2 G/DL (ref 3.2–4.9)
ALBUMIN/GLOB SERPL: 1.6 G/DL
ALP SERPL-CCNC: 95 U/L (ref 30–99)
ALT SERPL-CCNC: 11 U/L (ref 2–50)
ANION GAP SERPL CALC-SCNC: 11 MMOL/L (ref 7–16)
AST SERPL-CCNC: 19 U/L (ref 12–45)
BILIRUB SERPL-MCNC: 0.3 MG/DL (ref 0.1–1.5)
BUN SERPL-MCNC: 26 MG/DL (ref 8–22)
CALCIUM SERPL-MCNC: 9.3 MG/DL (ref 8.5–10.5)
CHLORIDE SERPL-SCNC: 102 MMOL/L (ref 96–112)
CHOLEST SERPL-MCNC: 156 MG/DL (ref 100–199)
CO2 SERPL-SCNC: 23 MMOL/L (ref 20–33)
CREAT SERPL-MCNC: 0.81 MG/DL (ref 0.5–1.4)
FASTING STATUS PATIENT QL REPORTED: NORMAL
GFR SERPLBLD CREATININE-BSD FMLA CKD-EPI: 73 ML/MIN/1.73 M 2
GLOBULIN SER CALC-MCNC: 2.6 G/DL (ref 1.9–3.5)
GLUCOSE SERPL-MCNC: 94 MG/DL (ref 65–99)
HDLC SERPL-MCNC: 57 MG/DL
LDLC SERPL CALC-MCNC: 81 MG/DL
POTASSIUM SERPL-SCNC: 4.7 MMOL/L (ref 3.6–5.5)
PROT SERPL-MCNC: 6.8 G/DL (ref 6–8.2)
SODIUM SERPL-SCNC: 136 MMOL/L (ref 135–145)
TRIGL SERPL-MCNC: 90 MG/DL (ref 0–149)

## 2022-08-05 PROCEDURE — 80061 LIPID PANEL: CPT

## 2022-08-05 PROCEDURE — 80053 COMPREHEN METABOLIC PANEL: CPT

## 2022-08-05 PROCEDURE — 36415 COLL VENOUS BLD VENIPUNCTURE: CPT

## 2022-08-12 ENCOUNTER — OFFICE VISIT (OUTPATIENT)
Dept: MEDICAL GROUP | Facility: PHYSICIAN GROUP | Age: 82
End: 2022-08-12
Payer: MEDICARE

## 2022-08-12 VITALS
DIASTOLIC BLOOD PRESSURE: 68 MMHG | TEMPERATURE: 97 F | BODY MASS INDEX: 26.06 KG/M2 | WEIGHT: 138 LBS | RESPIRATION RATE: 16 BRPM | HEART RATE: 78 BPM | HEIGHT: 61 IN | OXYGEN SATURATION: 96 % | SYSTOLIC BLOOD PRESSURE: 136 MMHG

## 2022-08-12 DIAGNOSIS — E89.0 POSTABLATIVE HYPOTHYROIDISM: ICD-10-CM

## 2022-08-12 DIAGNOSIS — I10 ESSENTIAL HYPERTENSION: ICD-10-CM

## 2022-08-12 DIAGNOSIS — R73.03 PREDIABETES: ICD-10-CM

## 2022-08-12 DIAGNOSIS — E78.00 HYPERCHOLESTEROLEMIA: ICD-10-CM

## 2022-08-12 DIAGNOSIS — F17.200 TOBACCO USE DISORDER, MILD, ABUSE: ICD-10-CM

## 2022-08-12 PROCEDURE — 99214 OFFICE O/P EST MOD 30 MIN: CPT | Performed by: FAMILY MEDICINE

## 2022-08-12 NOTE — ASSESSMENT & PLAN NOTE
This is a chronic health problem for this patient for which she does take a small dose of metformin 1000 mg daily.  Fasting glucose is completely normal at 94.  We will have her continue with that long-term.

## 2022-08-12 NOTE — ASSESSMENT & PLAN NOTE
This is a chronic health problem that is well controlled with current medications.  Her BP today is 136/68.  She denies CP, SOB or MELTON.

## 2022-08-12 NOTE — ASSESSMENT & PLAN NOTE
This is a chronic health problem for which she is on atorvastatin 80 mg daily.  Her total cholesterol is down to 156, triglycerides 90, HDL 57 and LDL is 81.  There is no signs of liver dysfunction.  She is tolerating medication well.  We will continue long-term.

## 2022-08-18 NOTE — PROGRESS NOTES
CC: Diagnoses of Essential hypertension, Hypercholesterolemia, Prediabetes, Tobacco use disorder, mild, abuse, and Postablative hypothyroidism were pertinent to this visit.                                                                                                                                         HPI:   Pam presents today with the following concerns:    1. Essential hypertension  Chronic health problem well-controlled with current meds.  Patient denies chest pain, shortness of breath or dyspnea on exertion    2. Hypercholesterolemia  Chronic health problem, adequately controlled with current medications.    3. Prediabetes  Chronic health problem adequately controlled with current lifestyle    4. Tobacco use disorder, mild, abuse  Chronic health problem that the patient absolutely denies she can quit smoking.  She does explain to me that her 's forgetfulness and cognitive impairment is worsening and she admits she uses tobacco as a way to deal with the stress    5. Postablative hypothyroidism  Chronic health problem for which we do need to do labs prior to her return       Patient Active Problem List    Diagnosis Date Noted    Coronary artery disease involving native coronary artery of native heart without angina pectoris 09/14/2017    Postablative hypothyroidism 09/14/2017    Hypercholesterolemia 09/14/2017    Tobacco use disorder, mild, abuse 09/14/2017    Prediabetes 09/14/2017    Essential hypertension 09/14/2017       Current Outpatient Medications   Medication Sig Dispense Refill    levothyroxine (SYNTHROID) 100 MCG Tab TAKE 1 TABLET EVERY MORNING ON AN EMPTY STOMACH 90 Tablet 3    lisinopril (PRINIVIL) 10 MG Tab TAKE 1 TABLET DAILY 90 Tablet 3    atorvastatin (LIPITOR) 80 MG tablet TAKE 1 TABLET DAILY 90 Tablet 3    metformin (GLUCOPHAGE) 1000 MG tablet TAKE 1 TABLET TWICE A  Tablet 3    hydroCHLOROthiazide (HYDRODIURIL) 25 MG Tab TAKE 1 TABLET DAILY 90 Tablet 3    aspirin (ASA)  "81 MG Chew Tab chewable tablet Take 81 mg by mouth every day.       No current facility-administered medications for this visit.         Allergies as of 08/12/2022    (No Known Allergies)            /68   Pulse 78   Temp 36.1 °C (97 °F) (Temporal)   Resp 16   Ht 1.556 m (5' 1.25\")   Wt 62.6 kg (138 lb)   SpO2 96%   BMI 25.86 kg/m²     Physical Exam:  Gen:         Alert and oriented, No apparent distress.  Neck:        No Lymphadenopathy or Bruits.  Lungs:     Clear to auscultation bilaterally  CV:          Regular rate and rhythm. No murmurs, rubs or gallops.               Ext:          No clubbing, cyanosis, edema.    LABS: 8/5/2022: Results reviewed and discussed with the patient, questions answered.      Assessment and Plan.   81 y.o. female with the following issues:    Essential hypertension  This is a chronic health problem that is well controlled with current medications.  Her BP today is 136/68.  She denies CP, SOB or MELTON.    Hypercholesterolemia  This is a chronic health problem for which she is on 6 atorvastatin 80 mg daily.  Her total cholesterol is down to 156, triglycerides 90, HDL 57 and LDL is 81.  There is no signs of liver dysfunction.  She is tolerating medication well.  We will continue long-term.    Prediabetes  This is a chronic health problem for this patient for which she does take a small dose of metformin 1000 mg daily.  Fasting glucose is completely normal at 94.  We will have her continue with that long-term.    Tobacco use disorder, mild, abuse  This a chronic health problem for which this patient continues to smoke when she is out gambling.       "

## 2022-08-22 ENCOUNTER — PATIENT MESSAGE (OUTPATIENT)
Dept: INTERNAL MEDICINE | Facility: IMAGING CENTER | Age: 82
End: 2022-08-22
Payer: MEDICARE

## 2022-10-24 ENCOUNTER — PATIENT MESSAGE (OUTPATIENT)
Dept: INTERNAL MEDICINE | Facility: IMAGING CENTER | Age: 82
End: 2022-10-24
Payer: MEDICARE

## 2022-11-11 ENCOUNTER — PATIENT MESSAGE (OUTPATIENT)
Dept: HEALTH INFORMATION MANAGEMENT | Facility: OTHER | Age: 82
End: 2022-11-11

## 2023-02-10 ENCOUNTER — HOSPITAL ENCOUNTER (OUTPATIENT)
Dept: LAB | Facility: MEDICAL CENTER | Age: 83
End: 2023-02-10
Attending: FAMILY MEDICINE
Payer: MEDICARE

## 2023-02-10 DIAGNOSIS — E89.0 POSTABLATIVE HYPOTHYROIDISM: ICD-10-CM

## 2023-02-10 DIAGNOSIS — R73.03 PREDIABETES: ICD-10-CM

## 2023-02-10 DIAGNOSIS — E78.00 HYPERCHOLESTEROLEMIA: ICD-10-CM

## 2023-02-10 LAB
ALBUMIN SERPL BCP-MCNC: 4.1 G/DL (ref 3.2–4.9)
ALBUMIN/GLOB SERPL: 1.4 G/DL
ALP SERPL-CCNC: 93 U/L (ref 30–99)
ALT SERPL-CCNC: 10 U/L (ref 2–50)
ANION GAP SERPL CALC-SCNC: 9 MMOL/L (ref 7–16)
AST SERPL-CCNC: 16 U/L (ref 12–45)
BILIRUB SERPL-MCNC: 0.5 MG/DL (ref 0.1–1.5)
BUN SERPL-MCNC: 25 MG/DL (ref 8–22)
CALCIUM ALBUM COR SERPL-MCNC: 10 MG/DL (ref 8.5–10.5)
CALCIUM SERPL-MCNC: 10.1 MG/DL (ref 8.5–10.5)
CHLORIDE SERPL-SCNC: 102 MMOL/L (ref 96–112)
CHOLEST SERPL-MCNC: 166 MG/DL (ref 100–199)
CO2 SERPL-SCNC: 25 MMOL/L (ref 20–33)
CREAT SERPL-MCNC: 0.81 MG/DL (ref 0.5–1.4)
GFR SERPLBLD CREATININE-BSD FMLA CKD-EPI: 73 ML/MIN/1.73 M 2
GLOBULIN SER CALC-MCNC: 3 G/DL (ref 1.9–3.5)
GLUCOSE SERPL-MCNC: 95 MG/DL (ref 65–99)
HDLC SERPL-MCNC: 60 MG/DL
LDLC SERPL CALC-MCNC: 86 MG/DL
POTASSIUM SERPL-SCNC: 5.5 MMOL/L (ref 3.6–5.5)
PROT SERPL-MCNC: 7.1 G/DL (ref 6–8.2)
SODIUM SERPL-SCNC: 136 MMOL/L (ref 135–145)
T4 FREE SERPL-MCNC: 1.67 NG/DL (ref 0.93–1.7)
TRIGL SERPL-MCNC: 98 MG/DL (ref 0–149)
TSH SERPL DL<=0.005 MIU/L-ACNC: 0.12 UIU/ML (ref 0.38–5.33)

## 2023-02-10 PROCEDURE — 83036 HEMOGLOBIN GLYCOSYLATED A1C: CPT | Mod: GA

## 2023-02-10 PROCEDURE — 84439 ASSAY OF FREE THYROXINE: CPT

## 2023-02-10 PROCEDURE — 80061 LIPID PANEL: CPT

## 2023-02-10 PROCEDURE — 36415 COLL VENOUS BLD VENIPUNCTURE: CPT | Mod: GA

## 2023-02-10 PROCEDURE — 80053 COMPREHEN METABOLIC PANEL: CPT

## 2023-02-10 PROCEDURE — 84443 ASSAY THYROID STIM HORMONE: CPT

## 2023-02-13 LAB
EST. AVERAGE GLUCOSE BLD GHB EST-MCNC: 131 MG/DL
HBA1C MFR BLD: 6.2 % (ref 4–5.6)

## 2023-02-17 ENCOUNTER — OFFICE VISIT (OUTPATIENT)
Dept: INTERNAL MEDICINE | Facility: IMAGING CENTER | Age: 83
End: 2023-02-17
Payer: MEDICARE

## 2023-02-17 VITALS
HEART RATE: 70 BPM | HEIGHT: 61 IN | WEIGHT: 130 LBS | DIASTOLIC BLOOD PRESSURE: 60 MMHG | BODY MASS INDEX: 24.55 KG/M2 | TEMPERATURE: 97.1 F | OXYGEN SATURATION: 94 % | RESPIRATION RATE: 16 BRPM | SYSTOLIC BLOOD PRESSURE: 128 MMHG

## 2023-02-17 DIAGNOSIS — R73.03 PREDIABETES: ICD-10-CM

## 2023-02-17 DIAGNOSIS — T14.8XXA BRUISING: ICD-10-CM

## 2023-02-17 DIAGNOSIS — I10 ESSENTIAL HYPERTENSION: ICD-10-CM

## 2023-02-17 DIAGNOSIS — E89.0 POSTABLATIVE HYPOTHYROIDISM: ICD-10-CM

## 2023-02-17 DIAGNOSIS — E78.00 HYPERCHOLESTEROLEMIA: ICD-10-CM

## 2023-02-17 PROCEDURE — 99214 OFFICE O/P EST MOD 30 MIN: CPT | Performed by: FAMILY MEDICINE

## 2023-02-17 ASSESSMENT — PATIENT HEALTH QUESTIONNAIRE - PHQ9: CLINICAL INTERPRETATION OF PHQ2 SCORE: 0

## 2023-02-17 NOTE — PROGRESS NOTES
CC: Diagnoses of Bruising, Essential hypertension, Postablative hypothyroidism, Hypercholesterolemia, and Prediabetes were pertinent to this visit.    Subjective                                                                                                                                       HPI:   aPm presents today with the following concerns:    1. Bruising  This is a new problem of concern to the patient every 6 months.  I have not done a CBC on her recently.  We will plan to recheck that notify her via [facility]    2. Essential hypertension  Chronic health problem, well controlled, continue with current meds and lifestyle.      3. Postablative hypothyroidism  Chronic health problem, well controlled, continue with current meds and lifestyle.      4. Hypercholesterolemia  Chronic health problem, well controlled, continue with current meds and lifestyle.      5. Prediabetes  Chronic health problem, well controlled, continue with current meds and lifestyle.         Patient Active Problem List    Diagnosis Date Noted    Bruising 02/17/2023    Coronary artery disease involving native coronary artery of native heart without angina pectoris 09/14/2017    Postablative hypothyroidism 09/14/2017    Hypercholesterolemia 09/14/2017    Tobacco use disorder, mild, abuse 09/14/2017    Prediabetes 09/14/2017    Essential hypertension 09/14/2017       Current Outpatient Medications   Medication Sig Dispense Refill    levothyroxine (SYNTHROID) 100 MCG Tab TAKE 1 TABLET EVERY MORNING ON AN EMPTY STOMACH 90 Tablet 3    lisinopril (PRINIVIL) 10 MG Tab TAKE 1 TABLET DAILY 90 Tablet 3    atorvastatin (LIPITOR) 80 MG tablet TAKE 1 TABLET DAILY 90 Tablet 3    metformin (GLUCOPHAGE) 1000 MG tablet TAKE 1 TABLET TWICE A  Tablet 3    hydroCHLOROthiazide (HYDRODIURIL) 25 MG Tab TAKE 1 TABLET DAILY 90 Tablet 3    aspirin (ASA) 81 MG Chew Tab chewable tablet Take 81 mg by mouth every day.       No current facility-administered  "medications for this visit.         Allergies as of 02/17/2023    (No Known Allergies)          Objective      /60 (BP Location: Left arm, Patient Position: Sitting, BP Cuff Size: Adult)   Pulse 70   Temp 36.2 °C (97.1 °F) (Temporal)   Resp 16   Ht 1.549 m (5' 1\")   Wt 59 kg (130 lb)   SpO2 94%   BMI 24.56 kg/m²     Physical Exam:  Gen:         Alert and oriented, No apparent distress.  Neck:        No Lymphadenopathy or Bruits.  Lungs:     Clear to auscultation bilaterally  CV:          Regular rate and rhythm. No murmurs, rubs or gallops.               Ext:          No clubbing, cyanosis, edema.    LABS: 2/10/2023: Results reviewed and discussed with the patient, questions answered.      Assessment & Plan    82 y.o. female with the following issues:    Bruising  This is a chronic problem that might be slightly worse and we have not done a CBC in over a year.  I would like to check her platelet count to make certain it is adequate. Her bruising is confined to her arms where she can usually identify the trauma associated with it.    Essential hypertension  This is a chronic health problem well controlled with Lisinopril 10 mg daily and HCTZ 25mg daily.  Her BP today is great at 128/60.  Will continue with current meds.  Denies chest pain, SOB or MELTON.    Postablative hypothyroidism  This is a chronic health problem that is adequately controlled with her levothyroxine 100 mcg daily.  Her TSH is slightly depressed at 0.120 but T4 is excellent at 1.67.  Continue with current dosing.    Hypercholesterolemia  This is a chronic health problem well controlled on atorvastatin 80 mg daily.  Her Total cholesterol is good at 166, triglycerides good at 98, HDL 60 and LDL is 86.    Prediabetes  This is a chronic health problem controlled with her Metformin 1000 mg daily.  Her blood sugar is good at 95 but A1c got up to 6.2.  She will continue with her meds and lifestyle mgmt.       "

## 2023-02-17 NOTE — ASSESSMENT & PLAN NOTE
This is a chronic problem that might be slightly worse and we have not done a CBC in over a year.  I would like to check her platelet count to make certain it is adequate. Her bruising is confined to her arms where she can usually identify the trauma associated with it.

## 2023-02-17 NOTE — ASSESSMENT & PLAN NOTE
This is a chronic health problem that is adequately controlled with her levothyroxine 100 mcg daily.  Her TSH is slightly depressed at 0.120 but T4 is excellent at 1.67.  Continue with current dosing.

## 2023-02-17 NOTE — ASSESSMENT & PLAN NOTE
This is a chronic health problem controlled with her Metformin 1000 mg daily.  Her blood sugar is good at 95 but A1c got up to 6.2.  She will continue with her meds and lifestyle mgmt.

## 2023-02-17 NOTE — ASSESSMENT & PLAN NOTE
This is a chronic health problem well controlled with Lisinopril 10 mg daily and HCTZ 25mg daily.  Her BP today is great at 128/60.  Will continue with current meds.  Denies chest pain, SOB or MELTON.

## 2023-02-17 NOTE — ASSESSMENT & PLAN NOTE
This is a chronic health problem well controlled on atorvastatin 80 mg daily.  Her Total cholesterol is good at 166, triglycerides good at 98, HDL 60 and LDL is 86.

## 2023-02-20 ENCOUNTER — HOSPITAL ENCOUNTER (OUTPATIENT)
Dept: LAB | Facility: MEDICAL CENTER | Age: 83
End: 2023-02-20
Attending: FAMILY MEDICINE
Payer: MEDICARE

## 2023-02-20 DIAGNOSIS — T14.8XXA BRUISING: ICD-10-CM

## 2023-02-20 DIAGNOSIS — R73.03 PREDIABETES: ICD-10-CM

## 2023-02-20 DIAGNOSIS — E78.00 HYPERCHOLESTEROLEMIA: ICD-10-CM

## 2023-02-20 LAB
ERYTHROCYTE [DISTWIDTH] IN BLOOD BY AUTOMATED COUNT: 41.1 FL (ref 35.9–50)
HCT VFR BLD AUTO: 40.9 % (ref 37–47)
HGB BLD-MCNC: 14.1 G/DL (ref 12–16)
MCH RBC QN AUTO: 30.7 PG (ref 27–33)
MCHC RBC AUTO-ENTMCNC: 34.5 G/DL (ref 33.6–35)
MCV RBC AUTO: 88.9 FL (ref 81.4–97.8)
PLATELET # BLD AUTO: 272 K/UL (ref 164–446)
PMV BLD AUTO: 8.8 FL (ref 9–12.9)
RBC # BLD AUTO: 4.6 M/UL (ref 4.2–5.4)
WBC # BLD AUTO: 7.2 K/UL (ref 4.8–10.8)

## 2023-02-20 PROCEDURE — 85027 COMPLETE CBC AUTOMATED: CPT

## 2023-02-20 PROCEDURE — 36415 COLL VENOUS BLD VENIPUNCTURE: CPT

## 2023-03-29 DIAGNOSIS — R73.03 PREDIABETES: ICD-10-CM

## 2023-03-29 DIAGNOSIS — I10 ESSENTIAL HYPERTENSION: ICD-10-CM

## 2023-03-31 RX ORDER — HYDROCHLOROTHIAZIDE 25 MG/1
TABLET ORAL
Qty: 90 TABLET | Refills: 3 | Status: SHIPPED | OUTPATIENT
Start: 2023-03-31 | End: 2023-11-17

## 2023-04-16 DIAGNOSIS — I10 ESSENTIAL HYPERTENSION: ICD-10-CM

## 2023-04-18 RX ORDER — ATORVASTATIN CALCIUM 80 MG/1
TABLET, FILM COATED ORAL
Qty: 90 TABLET | Refills: 3 | Status: SHIPPED | OUTPATIENT
Start: 2023-04-18

## 2023-04-18 RX ORDER — LISINOPRIL 10 MG/1
TABLET ORAL
Qty: 90 TABLET | Refills: 3 | Status: SHIPPED | OUTPATIENT
Start: 2023-04-18

## 2023-05-21 DIAGNOSIS — E89.0 POSTABLATIVE HYPOTHYROIDISM: ICD-10-CM

## 2023-05-22 ENCOUNTER — PATIENT MESSAGE (OUTPATIENT)
Dept: INTERNAL MEDICINE | Facility: IMAGING CENTER | Age: 83
End: 2023-05-22
Payer: MEDICARE

## 2023-05-23 RX ORDER — LEVOTHYROXINE SODIUM 0.1 MG/1
TABLET ORAL
Qty: 90 TABLET | Refills: 3 | Status: SHIPPED | OUTPATIENT
Start: 2023-05-23

## 2023-06-27 NOTE — ASSESSMENT & PLAN NOTE
This is a chronic health problem that is well controlled with current medications and lifestyle measures.  The patient denies chest pain, shortness of breath or dyspnea on exertion.     Treatment is not helping nothing has changed. Both eyes are red and the right has a red spot on it since this morning. Some days the eyes dry out and other days they just wont stop watering. She states sometimes her eyes are still blurry. Please advise    Thank you  Evelyn VAZQUEZ

## 2023-08-11 ENCOUNTER — HOSPITAL ENCOUNTER (OUTPATIENT)
Dept: LAB | Facility: MEDICAL CENTER | Age: 83
End: 2023-08-11
Attending: FAMILY MEDICINE
Payer: MEDICARE

## 2023-08-11 LAB
ALBUMIN SERPL BCP-MCNC: 4.4 G/DL (ref 3.2–4.9)
ALBUMIN/GLOB SERPL: 1.7 G/DL
ALP SERPL-CCNC: 93 U/L (ref 30–99)
ALT SERPL-CCNC: 12 U/L (ref 2–50)
ANION GAP SERPL CALC-SCNC: 11 MMOL/L (ref 7–16)
AST SERPL-CCNC: 16 U/L (ref 12–45)
BILIRUB SERPL-MCNC: 0.6 MG/DL (ref 0.1–1.5)
BUN SERPL-MCNC: 25 MG/DL (ref 8–22)
CALCIUM ALBUM COR SERPL-MCNC: 9.7 MG/DL (ref 8.5–10.5)
CALCIUM SERPL-MCNC: 10 MG/DL (ref 8.5–10.5)
CHLORIDE SERPL-SCNC: 96 MMOL/L (ref 96–112)
CHOLEST SERPL-MCNC: 142 MG/DL (ref 100–199)
CO2 SERPL-SCNC: 25 MMOL/L (ref 20–33)
CREAT SERPL-MCNC: 0.76 MG/DL (ref 0.5–1.4)
EST. AVERAGE GLUCOSE BLD GHB EST-MCNC: 128 MG/DL
GFR SERPLBLD CREATININE-BSD FMLA CKD-EPI: 79 ML/MIN/1.73 M 2
GLOBULIN SER CALC-MCNC: 2.6 G/DL (ref 1.9–3.5)
GLUCOSE SERPL-MCNC: 102 MG/DL (ref 65–99)
HBA1C MFR BLD: 6.1 % (ref 4–5.6)
HDLC SERPL-MCNC: 56 MG/DL
LDLC SERPL CALC-MCNC: 67 MG/DL
POTASSIUM SERPL-SCNC: 4.9 MMOL/L (ref 3.6–5.5)
PROT SERPL-MCNC: 7 G/DL (ref 6–8.2)
SODIUM SERPL-SCNC: 132 MMOL/L (ref 135–145)
TRIGL SERPL-MCNC: 95 MG/DL (ref 0–149)

## 2023-08-11 PROCEDURE — 80061 LIPID PANEL: CPT

## 2023-08-11 PROCEDURE — 83036 HEMOGLOBIN GLYCOSYLATED A1C: CPT | Mod: GA

## 2023-08-11 PROCEDURE — 36415 COLL VENOUS BLD VENIPUNCTURE: CPT

## 2023-08-11 PROCEDURE — 80053 COMPREHEN METABOLIC PANEL: CPT

## 2023-08-18 ENCOUNTER — OFFICE VISIT (OUTPATIENT)
Dept: INTERNAL MEDICINE | Facility: IMAGING CENTER | Age: 83
End: 2023-08-18
Payer: MEDICARE

## 2023-08-18 VITALS
OXYGEN SATURATION: 94 % | RESPIRATION RATE: 12 BRPM | SYSTOLIC BLOOD PRESSURE: 128 MMHG | WEIGHT: 129 LBS | HEART RATE: 69 BPM | BODY MASS INDEX: 24.35 KG/M2 | HEIGHT: 61 IN | TEMPERATURE: 96.6 F | DIASTOLIC BLOOD PRESSURE: 62 MMHG

## 2023-08-18 DIAGNOSIS — R73.03 PREDIABETES: ICD-10-CM

## 2023-08-18 DIAGNOSIS — I10 ESSENTIAL HYPERTENSION: ICD-10-CM

## 2023-08-18 DIAGNOSIS — E87.1 HYPONATREMIA: ICD-10-CM

## 2023-08-18 DIAGNOSIS — E78.00 HYPERCHOLESTEROLEMIA: ICD-10-CM

## 2023-08-18 PROCEDURE — 3074F SYST BP LT 130 MM HG: CPT | Performed by: FAMILY MEDICINE

## 2023-08-18 PROCEDURE — 3078F DIAST BP <80 MM HG: CPT | Performed by: FAMILY MEDICINE

## 2023-08-18 PROCEDURE — 99214 OFFICE O/P EST MOD 30 MIN: CPT | Performed by: FAMILY MEDICINE

## 2023-08-18 ASSESSMENT — FIBROSIS 4 INDEX: FIB4 SCORE: 1.36

## 2023-08-18 NOTE — ASSESSMENT & PLAN NOTE
Is a chronic health problem for this patient well-controlled on lisinopril 10 mg daily and hydrochlorothiazide 25 mg daily.  Blood pressure today 128/62.  I am going to stop her hydrochlorothiazide because she does have hyponatremia.  We will plan to recheck this in 3 months.  Any chest pain shortness of breath. patient denies chest pain, shortness of breath or dyspnea on exertion.

## 2023-08-18 NOTE — ASSESSMENT & PLAN NOTE
This is a chronic health problem for which the patient is on atorvastatin 80 mg daily.  Her total cholesterol is down to 142, triglycerides 95, HDL 56 LDL is excellent at 67.  There is no liver dysfunction and the patient's had no side effects from the meds.  We will have her continue with this long-term.  Plan to recheck in approximately 3 months.

## 2023-08-18 NOTE — ASSESSMENT & PLAN NOTE
This is a new problem for this patient where her sodium took a little bit of a dip to 132.  Looking back over her labs this is happened infrequently.  Looking at her medications I will bet this has something to do with the fact that she is still on hydrochlorothiazide 25 mg daily.  I am going to stop her hydrochlorothiazide and just continue her lisinopril 10 mg daily.  Her blood pressure is great at this time 128/62.  She will let me know if she sees any problems with edema in her hands or feet after stopping the hydrochlorothiazide.  We could always start it back at half dose.  We will plan to recheck labs in 3 months.

## 2023-09-01 NOTE — PROGRESS NOTES
CC: Diagnoses of Hyponatremia, Essential hypertension, Hypercholesterolemia, and Prediabetes were pertinent to this visit.    Subjective                                                                                                                                       HPI:   Pam presents today with the following concerns:    1. Hyponatremia  New problem uncontrolled, will start investigation    2. Essential hypertension  Chronic health problem, well controlled, continue with current meds and lifestyle.      3. Hypercholesterolemia  Chronic health problem, well controlled, continue with current meds and lifestyle.      4. Prediabetes  Chronic health problem, well controlled, continue with current meds and lifestyle.         Patient Active Problem List    Diagnosis Date Noted    Hyponatremia 08/18/2023    Bruising 02/17/2023    Coronary artery disease involving native coronary artery of native heart without angina pectoris 09/14/2017    Postablative hypothyroidism 09/14/2017    Hypercholesterolemia 09/14/2017    Tobacco use disorder, mild, abuse 09/14/2017    Prediabetes 09/14/2017    Essential hypertension 09/14/2017       Current Outpatient Medications   Medication Sig Dispense Refill    levothyroxine (SYNTHROID) 100 MCG Tab TAKE 1 TABLET EVERY MORNING ON AN EMPTY STOMACH 90 Tablet 3    atorvastatin (LIPITOR) 80 MG tablet TAKE 1 TABLET DAILY 90 Tablet 3    lisinopril (PRINIVIL) 10 MG Tab TAKE 1 TABLET DAILY 90 Tablet 3    hydroCHLOROthiazide (HYDRODIURIL) 25 MG Tab TAKE 1 TABLET DAILY 90 Tablet 3    metformin (GLUCOPHAGE) 1000 MG tablet TAKE 1 TABLET TWICE A  Tablet 3    aspirin (ASA) 81 MG Chew Tab chewable tablet Take 81 mg by mouth every day.       No current facility-administered medications for this visit.         Allergies as of 08/18/2023    (No Known Allergies)          Objective      /62 (BP Location: Left arm, Patient Position: Sitting, BP Cuff Size: Adult)   Pulse 69   Temp 35.9 °C  "(96.6 °F) (Temporal)   Resp 12   Ht 1.549 m (5' 1\")   Wt 58.5 kg (129 lb)   SpO2 94%   BMI 24.37 kg/m²     Physical Exam:  Gen:         Alert and oriented, No apparent distress.  Neck:        No Lymphadenopathy or Bruits.  Lungs:     Clear to auscultation bilaterally  CV:          Regular rate and rhythm. No murmurs, rubs or gallops.               Ext:          No clubbing, cyanosis, edema.    LABS: 8/11/2023: Results reviewed and discussed with the patient, questions answered.      Assessment & Plan    82 y.o. female with the following issues:    Hyponatremia  This is a new problem for this patient where her sodium took a little bit of a dip to 132.  Looking back over her labs this is happened infrequently.  Looking at her medications I will bet this has something to do with the fact that she is still on hydrochlorothiazide 25 mg daily.  I am going to stop her hydrochlorothiazide and just continue her lisinopril 10 mg daily.  Her blood pressure is great at this time 128/62.  She will let me know if she sees any problems with edema in her hands or feet after stopping the hydrochlorothiazide.  We could always start it back at half dose.  We will plan to recheck labs in 3 months.    Essential hypertension  Is a chronic health problem for this patient well-controlled on lisinopril 10 mg daily and hydrochlorothiazide 25 mg daily.  Blood pressure today 128/62.  I am going to stop her hydrochlorothiazide because she does have hyponatremia.  We will plan to recheck this in 3 months.  Any chest pain shortness of breath. patient denies chest pain, shortness of breath or dyspnea on exertion.    Hypercholesterolemia  This is a chronic health problem for which the patient is on atorvastatin 80 mg daily.  Her total cholesterol is down to 142, triglycerides 95, HDL 56 LDL is excellent at 67.  There is no liver dysfunction and the patient's had no side effects from the meds.  We will have her continue with this long-term.  " Plan to recheck in approximately 3 months.    Prediabetes  This is a chronic health problem for which the patient is on metformin 1000 mg twice daily.  Her fasting glucose has stayed excellent at 102 and her A1c is staying well controlled at 6.1.  She has had no side effects from the medication and will continue long-term.

## 2023-09-01 NOTE — ASSESSMENT & PLAN NOTE
This is a chronic health problem for which the patient is on metformin 1000 mg twice daily.  Her fasting glucose has stayed excellent at 102 and her A1c is staying well controlled at 6.1.  She has had no side effects from the medication and will continue long-term.

## 2023-11-02 NOTE — ASSESSMENT & PLAN NOTE
7w4d  Pt notified of UA results.  She is experiencing some pain with urination  Will treat with Keflex  Script faxed in  Pt aware urine culture is pending, and we'll contact her results when completed     This is a chronic health problem for this patient for which she is on Lipitor at 80 mg on the generic.  Her total cholesterol is 138, triglycerides 102, HDL 47 and her LDL is great at 71.  We will have her continue with current meds.  There is no liver dysfunction.

## 2023-11-10 ENCOUNTER — HOSPITAL ENCOUNTER (OUTPATIENT)
Dept: LAB | Facility: MEDICAL CENTER | Age: 83
End: 2023-11-10
Attending: FAMILY MEDICINE
Payer: MEDICARE

## 2023-11-10 DIAGNOSIS — R73.03 PREDIABETES: ICD-10-CM

## 2023-11-10 DIAGNOSIS — E78.00 HYPERCHOLESTEROLEMIA: ICD-10-CM

## 2023-11-10 LAB
ALBUMIN SERPL BCP-MCNC: 4.5 G/DL (ref 3.2–4.9)
ALBUMIN/GLOB SERPL: 1.5 G/DL
ALP SERPL-CCNC: 98 U/L (ref 30–99)
ALT SERPL-CCNC: 9 U/L (ref 2–50)
ANION GAP SERPL CALC-SCNC: 11 MMOL/L (ref 7–16)
AST SERPL-CCNC: 18 U/L (ref 12–45)
BILIRUB SERPL-MCNC: 0.6 MG/DL (ref 0.1–1.5)
BUN SERPL-MCNC: 16 MG/DL (ref 8–22)
CALCIUM ALBUM COR SERPL-MCNC: 10 MG/DL (ref 8.5–10.5)
CALCIUM SERPL-MCNC: 10.4 MG/DL (ref 8.5–10.5)
CHLORIDE SERPL-SCNC: 103 MMOL/L (ref 96–112)
CHOLEST SERPL-MCNC: 166 MG/DL (ref 100–199)
CO2 SERPL-SCNC: 25 MMOL/L (ref 20–33)
CREAT SERPL-MCNC: 0.71 MG/DL (ref 0.5–1.4)
ERYTHROCYTE [DISTWIDTH] IN BLOOD BY AUTOMATED COUNT: 45.5 FL (ref 35.9–50)
EST. AVERAGE GLUCOSE BLD GHB EST-MCNC: 126 MG/DL
FASTING STATUS PATIENT QL REPORTED: NORMAL
GFR SERPLBLD CREATININE-BSD FMLA CKD-EPI: 85 ML/MIN/1.73 M 2
GLOBULIN SER CALC-MCNC: 3.1 G/DL (ref 1.9–3.5)
GLUCOSE SERPL-MCNC: 95 MG/DL (ref 65–99)
HBA1C MFR BLD: 6 % (ref 4–5.6)
HCT VFR BLD AUTO: 44.6 % (ref 37–47)
HDLC SERPL-MCNC: 66 MG/DL
HGB BLD-MCNC: 14.7 G/DL (ref 12–16)
LDLC SERPL CALC-MCNC: 81 MG/DL
MCH RBC QN AUTO: 30.6 PG (ref 27–33)
MCHC RBC AUTO-ENTMCNC: 33 G/DL (ref 32.2–35.5)
MCV RBC AUTO: 92.7 FL (ref 81.4–97.8)
PLATELET # BLD AUTO: 265 K/UL (ref 164–446)
PMV BLD AUTO: 9.2 FL (ref 9–12.9)
POTASSIUM SERPL-SCNC: 5.2 MMOL/L (ref 3.6–5.5)
PROT SERPL-MCNC: 7.6 G/DL (ref 6–8.2)
RBC # BLD AUTO: 4.81 M/UL (ref 4.2–5.4)
SODIUM SERPL-SCNC: 139 MMOL/L (ref 135–145)
TRIGL SERPL-MCNC: 96 MG/DL (ref 0–149)
WBC # BLD AUTO: 6.8 K/UL (ref 4.8–10.8)

## 2023-11-10 PROCEDURE — 83036 HEMOGLOBIN GLYCOSYLATED A1C: CPT | Mod: GA

## 2023-11-10 PROCEDURE — 85027 COMPLETE CBC AUTOMATED: CPT

## 2023-11-10 PROCEDURE — 80061 LIPID PANEL: CPT

## 2023-11-10 PROCEDURE — 80053 COMPREHEN METABOLIC PANEL: CPT

## 2023-11-10 PROCEDURE — 36415 COLL VENOUS BLD VENIPUNCTURE: CPT

## 2023-11-17 ENCOUNTER — OFFICE VISIT (OUTPATIENT)
Dept: INTERNAL MEDICINE | Facility: IMAGING CENTER | Age: 83
End: 2023-11-17
Payer: MEDICARE

## 2023-11-17 VITALS
SYSTOLIC BLOOD PRESSURE: 122 MMHG | TEMPERATURE: 97.2 F | RESPIRATION RATE: 16 BRPM | WEIGHT: 130 LBS | HEIGHT: 61 IN | OXYGEN SATURATION: 94 % | DIASTOLIC BLOOD PRESSURE: 70 MMHG | BODY MASS INDEX: 24.55 KG/M2 | HEART RATE: 65 BPM

## 2023-11-17 DIAGNOSIS — E78.00 HYPERCHOLESTEROLEMIA: ICD-10-CM

## 2023-11-17 DIAGNOSIS — I10 ESSENTIAL HYPERTENSION: ICD-10-CM

## 2023-11-17 DIAGNOSIS — R73.03 PREDIABETES: ICD-10-CM

## 2023-11-17 PROCEDURE — 99214 OFFICE O/P EST MOD 30 MIN: CPT | Performed by: FAMILY MEDICINE

## 2023-11-17 PROCEDURE — 3078F DIAST BP <80 MM HG: CPT | Performed by: FAMILY MEDICINE

## 2023-11-17 PROCEDURE — 3074F SYST BP LT 130 MM HG: CPT | Performed by: FAMILY MEDICINE

## 2023-11-17 ASSESSMENT — FIBROSIS 4 INDEX: FIB4 SCORE: 1.81

## 2023-11-17 NOTE — ASSESSMENT & PLAN NOTE
This is a chronic health problem for which patient is on atorvastatin 80 mg daily.  Her total cholesterol is down to 166, triglycerides 96, HDL went up to 66 her LDL went up slightly to 81, but it is still at an acceptable range and protective against heart disease.  She will continue with current medications.  There is no liver dysfunction.

## 2023-11-17 NOTE — ASSESSMENT & PLAN NOTE
This is a chronic health problem for which the patient is on lisinopril 10 mg daily.  Her blood pressure is excellent at 122/70.   Patient denies chest pain, shortness of breath or dyspnea on exertion.

## 2023-11-17 NOTE — ASSESSMENT & PLAN NOTE
This is a chronic health problem that the patient manages with Glucophage 1000 mg twice daily.  She is also careful about what she eats.  Her fasting glucose is down to 95 and her A1c is 6.0.  We will have her continue with current medications and lifestyle.

## 2023-11-17 NOTE — PROGRESS NOTES
"      CC: Diagnoses of Prediabetes, Essential hypertension, and Hypercholesterolemia were pertinent to this visit.    Subjective                                                                                                                                       HPI:   Pam presents today with the following concerns:    1. Prediabetes  Chronic health problem, well controlled, continue with current meds and lifestyle.      2. Essential hypertension  Chronic health problem, well controlled, continue with current meds and lifestyle.      3. Hypercholesterolemia  Chronic health problem, well controlled, continue with current meds and lifestyle.           Patient Active Problem List    Diagnosis Date Noted    Hyponatremia 08/18/2023    Bruising 02/17/2023    Coronary artery disease involving native coronary artery of native heart without angina pectoris 09/14/2017    Postablative hypothyroidism 09/14/2017    Hypercholesterolemia 09/14/2017    Tobacco use disorder, mild, abuse 09/14/2017    Prediabetes 09/14/2017    Essential hypertension 09/14/2017       Current Outpatient Medications   Medication Sig Dispense Refill    levothyroxine (SYNTHROID) 100 MCG Tab TAKE 1 TABLET EVERY MORNING ON AN EMPTY STOMACH 90 Tablet 3    atorvastatin (LIPITOR) 80 MG tablet TAKE 1 TABLET DAILY 90 Tablet 3    lisinopril (PRINIVIL) 10 MG Tab TAKE 1 TABLET DAILY 90 Tablet 3    metformin (GLUCOPHAGE) 1000 MG tablet TAKE 1 TABLET TWICE A  Tablet 3    aspirin (ASA) 81 MG Chew Tab chewable tablet Take 81 mg by mouth every day.       No current facility-administered medications for this visit.         Allergies as of 11/17/2023    (No Known Allergies)          Objective      /70 (BP Location: Left arm, Patient Position: Sitting, BP Cuff Size: Adult)   Pulse 65   Temp 36.2 °C (97.2 °F) (Temporal)   Resp 16   Ht 1.549 m (5' 1\")   Wt 59 kg (130 lb)   SpO2 94%   BMI 24.56 kg/m²     Physical Exam:  Gen:         Alert and oriented, No " apparent distress.  Neck:        No Lymphadenopathy or Bruits.  Lungs:     Clear to auscultation bilaterally  CV:          Regular rate and rhythm. No murmurs, rubs or gallops.               Ext:          No clubbing, cyanosis, edema.    LABS: 11/10/23: Results reviewed and discussed with the patient, questions answered.      Assessment & Plan    82 y.o. female with the following issues:    Prediabetes  This is a chronic health problem that the patient manages with Glucophage 1000 mg twice daily.  She is also careful about what she eats.  Her fasting glucose is down to 95 and her A1c is 6.0.  We will have her continue with current medications and lifestyle.    Essential hypertension  This is a chronic health problem for which the patient is on lisinopril 10 mg daily.  Her blood pressure is excellent at 122/70.   Patient denies chest pain, shortness of breath or dyspnea on exertion.    Hypercholesterolemia  This is a chronic health problem for which patient is on atorvastatin 80 mg daily.  Her total cholesterol is down to 166, triglycerides 96, HDL went up to 66 her LDL went up slightly to 81, but it is still at an acceptable range and protective against heart disease.  She will continue with current medications.  There is no liver dysfunction.

## 2023-11-29 ENCOUNTER — PATIENT MESSAGE (OUTPATIENT)
Dept: HEALTH INFORMATION MANAGEMENT | Facility: OTHER | Age: 83
End: 2023-11-29

## 2024-03-01 ENCOUNTER — HOSPITAL ENCOUNTER (OUTPATIENT)
Dept: LAB | Facility: MEDICAL CENTER | Age: 84
End: 2024-03-01
Attending: FAMILY MEDICINE
Payer: MEDICARE

## 2024-03-01 DIAGNOSIS — E87.1 HYPONATREMIA: ICD-10-CM

## 2024-03-01 DIAGNOSIS — E89.0 POSTABLATIVE HYPOTHYROIDISM: ICD-10-CM

## 2024-03-01 DIAGNOSIS — I10 ESSENTIAL HYPERTENSION: ICD-10-CM

## 2024-03-01 DIAGNOSIS — E78.00 HYPERCHOLESTEROLEMIA: ICD-10-CM

## 2024-03-01 DIAGNOSIS — R73.03 PREDIABETES: ICD-10-CM

## 2024-03-01 LAB
ALBUMIN SERPL BCP-MCNC: 4.4 G/DL (ref 3.2–4.9)
ALBUMIN/GLOB SERPL: 1.5 G/DL
ALP SERPL-CCNC: 118 U/L (ref 30–99)
ALT SERPL-CCNC: 10 U/L (ref 2–50)
ANION GAP SERPL CALC-SCNC: 10 MMOL/L (ref 7–16)
AST SERPL-CCNC: 17 U/L (ref 12–45)
BILIRUB SERPL-MCNC: 0.5 MG/DL (ref 0.1–1.5)
BUN SERPL-MCNC: 17 MG/DL (ref 8–22)
CALCIUM ALBUM COR SERPL-MCNC: 9.6 MG/DL (ref 8.5–10.5)
CALCIUM SERPL-MCNC: 9.9 MG/DL (ref 8.5–10.5)
CHLORIDE SERPL-SCNC: 102 MMOL/L (ref 96–112)
CHOLEST SERPL-MCNC: 195 MG/DL (ref 100–199)
CO2 SERPL-SCNC: 25 MMOL/L (ref 20–33)
CREAT SERPL-MCNC: 0.84 MG/DL (ref 0.5–1.4)
EST. AVERAGE GLUCOSE BLD GHB EST-MCNC: 131 MG/DL
FASTING STATUS PATIENT QL REPORTED: NORMAL
GFR SERPLBLD CREATININE-BSD FMLA CKD-EPI: 70 ML/MIN/1.73 M 2
GLOBULIN SER CALC-MCNC: 3 G/DL (ref 1.9–3.5)
GLUCOSE SERPL-MCNC: 113 MG/DL (ref 65–99)
HBA1C MFR BLD: 6.2 % (ref 4–5.6)
HDLC SERPL-MCNC: 73 MG/DL
LDLC SERPL CALC-MCNC: 106 MG/DL
POTASSIUM SERPL-SCNC: 4.7 MMOL/L (ref 3.6–5.5)
PROT SERPL-MCNC: 7.4 G/DL (ref 6–8.2)
SODIUM SERPL-SCNC: 137 MMOL/L (ref 135–145)
T4 FREE SERPL-MCNC: 1.7 NG/DL (ref 0.93–1.7)
TRIGL SERPL-MCNC: 82 MG/DL (ref 0–149)
TSH SERPL DL<=0.005 MIU/L-ACNC: 0.73 UIU/ML (ref 0.38–5.33)

## 2024-03-01 PROCEDURE — 36415 COLL VENOUS BLD VENIPUNCTURE: CPT

## 2024-03-01 PROCEDURE — 80061 LIPID PANEL: CPT

## 2024-03-01 PROCEDURE — 84443 ASSAY THYROID STIM HORMONE: CPT

## 2024-03-01 PROCEDURE — 83036 HEMOGLOBIN GLYCOSYLATED A1C: CPT | Mod: GA

## 2024-03-01 PROCEDURE — 84439 ASSAY OF FREE THYROXINE: CPT

## 2024-03-01 PROCEDURE — 80053 COMPREHEN METABOLIC PANEL: CPT

## 2024-03-08 ENCOUNTER — OFFICE VISIT (OUTPATIENT)
Dept: INTERNAL MEDICINE | Facility: IMAGING CENTER | Age: 84
End: 2024-03-08
Payer: MEDICARE

## 2024-03-08 VITALS
TEMPERATURE: 97.5 F | RESPIRATION RATE: 14 BRPM | DIASTOLIC BLOOD PRESSURE: 74 MMHG | BODY MASS INDEX: 24.92 KG/M2 | SYSTOLIC BLOOD PRESSURE: 132 MMHG | HEIGHT: 61 IN | WEIGHT: 132 LBS | HEART RATE: 72 BPM | OXYGEN SATURATION: 96 %

## 2024-03-08 DIAGNOSIS — I10 ESSENTIAL HYPERTENSION: ICD-10-CM

## 2024-03-08 DIAGNOSIS — E89.0 POSTABLATIVE HYPOTHYROIDISM: ICD-10-CM

## 2024-03-08 DIAGNOSIS — E78.00 HYPERCHOLESTEROLEMIA: ICD-10-CM

## 2024-03-08 DIAGNOSIS — R73.03 PREDIABETES: ICD-10-CM

## 2024-03-08 PROCEDURE — 3075F SYST BP GE 130 - 139MM HG: CPT | Performed by: FAMILY MEDICINE

## 2024-03-08 PROCEDURE — 99214 OFFICE O/P EST MOD 30 MIN: CPT | Performed by: FAMILY MEDICINE

## 2024-03-08 PROCEDURE — 3078F DIAST BP <80 MM HG: CPT | Performed by: FAMILY MEDICINE

## 2024-03-08 ASSESSMENT — FIBROSIS 4 INDEX: FIB4 SCORE: 1.64

## 2024-03-08 ASSESSMENT — PATIENT HEALTH QUESTIONNAIRE - PHQ9: CLINICAL INTERPRETATION OF PHQ2 SCORE: 0

## 2024-03-08 NOTE — PROGRESS NOTES
Verbal consent was acquired by the patient to use [employer] ambient listening note generation during this visit yes    Assessment & Plan:  Assessment & Plan  1. Hypertension.  This is a chronic problem, well controlled. She will continue with lisinopril 10 mg daily.    2. Hyperlipidemia.  This is chronic, borderline control on atorvastatin 80 mg daily. The patient is going to work harder on her dietary changes. Her total cholesterol came in at 195, which is great. Her triglycerides are 82. Her HDL is 73, but her LDL is elevated at 106. She will continue atorvastatin 80 mg daily.    3. Prediabetes.  Her fasting glucose on her lab work went up to 113 and her A1c is up to 6.2. Both of those numbers still qualify her as prediabetes, but she is going to have to be more careful. She is going to work on lifestyle management. We are going to cut back on her metformin to just 1000 mg daily because of the side effect of diarrhea when she takes 2.    4. Post ablation hypothyroidism.  This is well controlled on levothyroxine 100 mcg daily. We will continue that dose for the coming year.    She does take a daily 81 mg aspirin.    Follow-up  The patient will follow up in 3 months.        Subjective:     HPI:   History of Present Illness  The patient presents for evaluation of multiple medical concerns. We are going to discuss her chronic health problems as well as the labs that she had drawn on 03/01/2024. She is accompanied by her , Chivo.    She denies chest pain, shortness of breath, or dyspnea on exertion. She is on lisinopril 10 mg daily.   She is on atorvastatin maximum dose at 80 mg. She tells me that recently she has been trying some different foods, one of them being something called [employer] salad mix, which is mostly vegetables, but it does have a dressing, but she does not make the salad swim in it and has not been eating a bunch of sweets or something like that. She ate chocolates during [place]. She has  "been taking atorvastatin since her bypass surgery 24 years ago.    She takes metformin 2 tablets once a day. Since her last visit, she has been having diarrhea in the middle of the night.    She has post ablation hypothyroidism and is on levothyroxine 100 mcg daily.    Health Maintenance: Completed    Objective:     Exam:  /74 (BP Location: Right arm, Patient Position: Sitting, BP Cuff Size: Adult)   Pulse 72   Temp 36.4 °C (97.5 °F) (Temporal)   Resp 14   Ht 1.549 m (5' 1\")   Wt 59.9 kg (132 lb)   SpO2 96%   BMI 24.94 kg/m²  Body mass index is 24.94 kg/m².    General:  Well nourished, well developed female in NAD  Head is grossly normal.  .  LABS: 3/1/24: Results reviewed and discussed with the patient, questions answered.            Results  Laboratory Studies  Labs were reviewed with the patient.    Assessment & Plan:     1. Essential hypertension        2. Hypercholesterolemia        3. Prediabetes  metformin (GLUCOPHAGE) 1000 MG tablet      4. Postablative hypothyroidism                      Please note that this dictation was created using voice recognition software. I have made every reasonable attempt to correct obvious errors, but I expect that there are errors of grammar and possibly content that I did not discover before finalizing the note.        "

## 2024-03-24 DIAGNOSIS — I10 ESSENTIAL HYPERTENSION: ICD-10-CM

## 2024-03-25 RX ORDER — HYDROCHLOROTHIAZIDE 25 MG/1
TABLET ORAL
Qty: 90 TABLET | Refills: 3 | Status: SHIPPED | OUTPATIENT
Start: 2024-03-25

## 2024-04-09 DIAGNOSIS — I10 ESSENTIAL HYPERTENSION: ICD-10-CM

## 2024-04-10 RX ORDER — ATORVASTATIN CALCIUM 80 MG/1
TABLET, FILM COATED ORAL
Qty: 90 TABLET | Refills: 3 | Status: SHIPPED | OUTPATIENT
Start: 2024-04-10

## 2024-04-10 RX ORDER — LISINOPRIL 10 MG/1
TABLET ORAL
Qty: 90 TABLET | Refills: 3 | Status: SHIPPED | OUTPATIENT
Start: 2024-04-10

## 2024-05-13 DIAGNOSIS — E89.0 POSTABLATIVE HYPOTHYROIDISM: ICD-10-CM

## 2024-05-14 RX ORDER — LEVOTHYROXINE SODIUM 0.1 MG/1
TABLET ORAL
Qty: 90 TABLET | Refills: 3 | Status: SHIPPED | OUTPATIENT
Start: 2024-05-14

## 2024-05-31 ENCOUNTER — HOSPITAL ENCOUNTER (OUTPATIENT)
Dept: LAB | Facility: MEDICAL CENTER | Age: 84
End: 2024-05-31
Attending: FAMILY MEDICINE
Payer: MEDICARE

## 2024-05-31 DIAGNOSIS — E78.00 HYPERCHOLESTEROLEMIA: ICD-10-CM

## 2024-05-31 LAB
ALBUMIN SERPL BCP-MCNC: 4 G/DL (ref 3.2–4.9)
ALBUMIN/GLOB SERPL: 1.4 G/DL
ALP SERPL-CCNC: 118 U/L (ref 30–99)
ALT SERPL-CCNC: 13 U/L (ref 2–50)
ANION GAP SERPL CALC-SCNC: 12 MMOL/L (ref 7–16)
AST SERPL-CCNC: 16 U/L (ref 12–45)
BILIRUB SERPL-MCNC: 0.6 MG/DL (ref 0.1–1.5)
BUN SERPL-MCNC: 15 MG/DL (ref 8–22)
CALCIUM ALBUM COR SERPL-MCNC: 9.7 MG/DL (ref 8.5–10.5)
CALCIUM SERPL-MCNC: 9.7 MG/DL (ref 8.5–10.5)
CHLORIDE SERPL-SCNC: 104 MMOL/L (ref 96–112)
CHOLEST SERPL-MCNC: 154 MG/DL (ref 100–199)
CO2 SERPL-SCNC: 23 MMOL/L (ref 20–33)
CREAT SERPL-MCNC: 0.76 MG/DL (ref 0.5–1.4)
FASTING STATUS PATIENT QL REPORTED: NORMAL
GFR SERPLBLD CREATININE-BSD FMLA CKD-EPI: 78 ML/MIN/1.73 M 2
GLOBULIN SER CALC-MCNC: 2.9 G/DL (ref 1.9–3.5)
GLUCOSE SERPL-MCNC: 103 MG/DL (ref 65–99)
HDLC SERPL-MCNC: 66 MG/DL
LDLC SERPL CALC-MCNC: 72 MG/DL
POTASSIUM SERPL-SCNC: 4.5 MMOL/L (ref 3.6–5.5)
PROT SERPL-MCNC: 6.9 G/DL (ref 6–8.2)
SODIUM SERPL-SCNC: 139 MMOL/L (ref 135–145)
TRIGL SERPL-MCNC: 79 MG/DL (ref 0–149)

## 2024-05-31 PROCEDURE — 80053 COMPREHEN METABOLIC PANEL: CPT

## 2024-05-31 PROCEDURE — 80061 LIPID PANEL: CPT

## 2024-05-31 PROCEDURE — 36415 COLL VENOUS BLD VENIPUNCTURE: CPT

## 2024-06-07 ENCOUNTER — OFFICE VISIT (OUTPATIENT)
Dept: INTERNAL MEDICINE | Facility: IMAGING CENTER | Age: 84
End: 2024-06-07
Payer: MEDICARE

## 2024-06-07 VITALS
OXYGEN SATURATION: 94 % | HEIGHT: 61 IN | RESPIRATION RATE: 14 BRPM | TEMPERATURE: 97.4 F | DIASTOLIC BLOOD PRESSURE: 80 MMHG | BODY MASS INDEX: 26.06 KG/M2 | SYSTOLIC BLOOD PRESSURE: 174 MMHG | HEART RATE: 69 BPM | WEIGHT: 138 LBS

## 2024-06-07 DIAGNOSIS — E87.1 HYPONATREMIA: ICD-10-CM

## 2024-06-07 DIAGNOSIS — R73.03 PREDIABETES: ICD-10-CM

## 2024-06-07 DIAGNOSIS — E78.00 HYPERCHOLESTEROLEMIA: ICD-10-CM

## 2024-06-07 DIAGNOSIS — F17.200 TOBACCO USE DISORDER, MILD, ABUSE: ICD-10-CM

## 2024-06-07 DIAGNOSIS — I10 ESSENTIAL HYPERTENSION: ICD-10-CM

## 2024-06-07 PROCEDURE — 3077F SYST BP >= 140 MM HG: CPT | Performed by: FAMILY MEDICINE

## 2024-06-07 PROCEDURE — 99214 OFFICE O/P EST MOD 30 MIN: CPT | Performed by: FAMILY MEDICINE

## 2024-06-07 PROCEDURE — 3079F DIAST BP 80-89 MM HG: CPT | Performed by: FAMILY MEDICINE

## 2024-06-07 PROCEDURE — 1126F AMNT PAIN NOTED NONE PRSNT: CPT | Performed by: FAMILY MEDICINE

## 2024-06-07 RX ORDER — LISINOPRIL 20 MG/1
20 TABLET ORAL DAILY
Qty: 90 TABLET | Refills: 3 | Status: SHIPPED | OUTPATIENT
Start: 2024-06-07

## 2024-06-07 ASSESSMENT — FIBROSIS 4 INDEX: FIB4 SCORE: 1.36

## 2024-06-07 ASSESSMENT — PAIN SCALES - GENERAL: PAINLEVEL: NO PAIN

## 2024-06-07 NOTE — PROGRESS NOTES
Verbal consent was acquired by the patient to use [employer] ambient listening note generation during this visit yes    Assessment & Plan:  Assessment & Plan  1. Tobacco use disorder.  This is a chronic problem, currently uncontrolled. The patient acknowledges the necessity to cease smoking, which she finds as an escape and assists her in managing the significant stress of caring for her  with multiple health issues.    2. Prediabetes.  This is a chronic condition that is well-managed with lifestyle management, and she is not currently on any medications. . An A1c test will be conducted during her next visit in 3 months to ensure she is not progressing into diabetes without medication.    3. Hyponatremia.  This condition has now resolved following discontinuation of hydrochlorothiazide. This issue will be resolved.    4. Hypercholesterolemia.  This is a chronic condition that is well managed with the current medications.    5. Essential hypertension.  This condition is uncontrolled, a chronic condition. The dosage of lisinopril will be increased to 20 mg daily. The patient will monitor her blood pressure in approximately 7 to 10 days and report if it remains above 140 systolic.        Subjective:     HPI:   History of Present Illness  We are seeing her regarding her chronic health problems and the labs she had drawn prior to the visit. The chronic health problems to discuss today include prediabetes, tobacco use disorder, hyponatremia, hypercholesterolemia, and essential hypertension.    The patient, initially prescribed metformin at a dosage of 2000 mg daily, was reduced to 1000 mg, but subsequently developed diarrheas, secondary to the metformin.  No problems since she stopped the medication.  Consequently, she discontinued the metformin, which effectively resolved the diarrhea. Her fasting glucose level was recorded at 103.    The patient acknowledges the necessity to cease tobacco use. She only smokes  "when [place] is [place] a [place], allowing her to spend 2 hours daily away due to her caregiving responsibilities for her , who has vascular dementia and macular degeneration. He is a total care individual, which necessitates her time away from home for her own sanity.    Since discontinuing her hydrochlorothiazide, the patient's sodium levels have ceased, and her fasting sodium level was recorded at 139, indicating a hypersensitivity to hydrochlorothiazide.    The patient's hypertension is currently not well-managed. She is currently prescribed lisinopril 10 mg, which she takes in the morning.  She has not been monitoring her blood pressure at home but I am definitely increasing her lisinopril because her blood pressure is much higher since we discontinued the hydrochlorothiazide        Health Maintenance: Completed    Objective:     Exam:  BP (!) 174/80 (BP Location: Left arm, Patient Position: Sitting, BP Cuff Size: Adult)   Pulse 69   Temp 36.3 °C (97.4 °F) (Temporal)   Resp 14   Ht 1.549 m (5' 1\")   Wt 62.6 kg (138 lb)   SpO2 94%   BMI 26.07 kg/m²  Body mass index is 26.07 kg/m².    General:  Well nourished, well developed female in NAD  Head is grossly normal.  Neck: Supple without JVD or bruit. Thyroid is not enlarged.  Pulmonary: Clear to ausculation and percussion.  Normal effort. No rales, ronchi, or wheezing.  Cardiovascular: Regular rate and rhythm without murmur. Carotid and radial pulses are intact and equal bilaterally.  Extremities: no clubbing, cyanosis, or edema.    Results  Laboratory Studies: 5/31/2014  Fasting sodium was 139. Total cholesterol is 154, triglycerides 79, HDL 66, LDL is down to 72. GFR has gone up to 78.                      Please note that this dictation was created using voice recognition software. I have made every reasonable attempt to correct obvious errors, but I expect that there are errors of grammar and possibly content that I did not discover before finalizing the " note.

## 2024-08-30 ENCOUNTER — HOSPITAL ENCOUNTER (OUTPATIENT)
Dept: LAB | Facility: MEDICAL CENTER | Age: 84
End: 2024-08-30
Attending: FAMILY MEDICINE
Payer: MEDICARE

## 2024-08-30 DIAGNOSIS — E78.00 HYPERCHOLESTEROLEMIA: ICD-10-CM

## 2024-08-30 DIAGNOSIS — R73.03 PREDIABETES: ICD-10-CM

## 2024-08-30 LAB
ALBUMIN SERPL BCP-MCNC: 4.2 G/DL (ref 3.2–4.9)
ALBUMIN/GLOB SERPL: 1.5 G/DL
ALP SERPL-CCNC: 108 U/L (ref 30–99)
ALT SERPL-CCNC: 13 U/L (ref 2–50)
ANION GAP SERPL CALC-SCNC: 11 MMOL/L (ref 7–16)
AST SERPL-CCNC: 20 U/L (ref 12–45)
BILIRUB SERPL-MCNC: 0.6 MG/DL (ref 0.1–1.5)
BUN SERPL-MCNC: 21 MG/DL (ref 8–22)
CALCIUM ALBUM COR SERPL-MCNC: 9.6 MG/DL (ref 8.5–10.5)
CALCIUM SERPL-MCNC: 9.8 MG/DL (ref 8.5–10.5)
CHLORIDE SERPL-SCNC: 107 MMOL/L (ref 96–112)
CHOLEST SERPL-MCNC: 152 MG/DL (ref 100–199)
CO2 SERPL-SCNC: 23 MMOL/L (ref 20–33)
CREAT SERPL-MCNC: 0.8 MG/DL (ref 0.5–1.4)
EST. AVERAGE GLUCOSE BLD GHB EST-MCNC: 137 MG/DL
FASTING STATUS PATIENT QL REPORTED: NORMAL
GFR SERPLBLD CREATININE-BSD FMLA CKD-EPI: 74 ML/MIN/1.73 M 2
GLOBULIN SER CALC-MCNC: 2.8 G/DL (ref 1.9–3.5)
GLUCOSE SERPL-MCNC: 106 MG/DL (ref 65–99)
HBA1C MFR BLD: 6.4 % (ref 4–5.6)
HDLC SERPL-MCNC: 63 MG/DL
LDLC SERPL CALC-MCNC: 72 MG/DL
POTASSIUM SERPL-SCNC: 4.4 MMOL/L (ref 3.6–5.5)
PROT SERPL-MCNC: 7 G/DL (ref 6–8.2)
SODIUM SERPL-SCNC: 141 MMOL/L (ref 135–145)
TRIGL SERPL-MCNC: 86 MG/DL (ref 0–149)

## 2024-08-30 PROCEDURE — 80061 LIPID PANEL: CPT

## 2024-08-30 PROCEDURE — 36415 COLL VENOUS BLD VENIPUNCTURE: CPT

## 2024-08-30 PROCEDURE — 83036 HEMOGLOBIN GLYCOSYLATED A1C: CPT | Mod: GA

## 2024-08-30 PROCEDURE — 80053 COMPREHEN METABOLIC PANEL: CPT

## 2024-09-06 ENCOUNTER — OFFICE VISIT (OUTPATIENT)
Dept: INTERNAL MEDICINE | Facility: IMAGING CENTER | Age: 84
End: 2024-09-06
Payer: MEDICARE

## 2024-09-06 VITALS
HEIGHT: 61 IN | TEMPERATURE: 97.1 F | RESPIRATION RATE: 14 BRPM | DIASTOLIC BLOOD PRESSURE: 70 MMHG | BODY MASS INDEX: 25.86 KG/M2 | WEIGHT: 137 LBS | OXYGEN SATURATION: 96 % | HEART RATE: 63 BPM | SYSTOLIC BLOOD PRESSURE: 176 MMHG

## 2024-09-06 DIAGNOSIS — E78.00 HYPERCHOLESTEROLEMIA: ICD-10-CM

## 2024-09-06 DIAGNOSIS — I10 ESSENTIAL HYPERTENSION: ICD-10-CM

## 2024-09-06 DIAGNOSIS — R73.03 PREDIABETES: ICD-10-CM

## 2024-09-06 DIAGNOSIS — F17.200 TOBACCO USE DISORDER, MILD, ABUSE: ICD-10-CM

## 2024-09-06 PROCEDURE — G2211 COMPLEX E/M VISIT ADD ON: HCPCS | Performed by: FAMILY MEDICINE

## 2024-09-06 PROCEDURE — 3078F DIAST BP <80 MM HG: CPT | Performed by: FAMILY MEDICINE

## 2024-09-06 PROCEDURE — 99214 OFFICE O/P EST MOD 30 MIN: CPT | Performed by: FAMILY MEDICINE

## 2024-09-06 PROCEDURE — 3077F SYST BP >= 140 MM HG: CPT | Performed by: FAMILY MEDICINE

## 2024-09-06 RX ORDER — OLMESARTAN MEDOXOMIL 40 MG/1
40 TABLET ORAL DAILY
Qty: 90 TABLET | Refills: 3 | Status: SHIPPED | OUTPATIENT
Start: 2024-09-06

## 2024-09-06 ASSESSMENT — FIBROSIS 4 INDEX: FIB4 SCORE: 1.7

## 2024-09-06 NOTE — PROGRESS NOTES
Verbal consent was acquired by the patient to use [email or web address] ambient listening note generation during this visit yes    Assessment & Plan:  Assessment & Plan  1. Tobacco use disorder.  Given her caregiving responsibilities for her , she has naturally reduced her tobacco consumption. This could be a viable path towards complete cessation. Her smoking habit, understood as a stress coping mechanism, is taken into consideration.    2. Prediabetes.  Her fasting glucose increased slightly from 103 to 106, likely due to stress. Her A1c remains below the diabetic range at 6.4. She has recently resumed physical exercise. Continuation of diet and lifestyle management is advised.     3. Hypercholesterolemia.  Her cholesterol levels are well-managed with atorvastatin 80 mg daily. Total cholesterol is 152, triglycerides are 86, HDL is 63, and LDL is 72. Kidney function is normal with a GFR of 74. Long-term continuation of atorvastatin is recommended.    4. Essential hypertension.  This is a chronic, currently uncontrolled health issue. Her blood pressure today is 176/70. She will maintain her lisinopril dosage at 40 mg daily until the new medication arrives. Upon its arrival, she will transition to olmesartan 40 mg daily. It is anticipated that this will effectively manage her blood pressure.    Follow-up  A follow-up appointment is scheduled for 11/15/2024.    Billing : secondary to the complexity of this patient's illnesses and their interactions.  All problems listed were discussed during the office visit, medications were evaluated and complexities were discussed as well as plan for the future.       Subjective:     HPI:   History of Present Illness  The patient is an 83-year-old female who is being seen for the problems of tobacco use, mild prediabetes, hypercholesterolemia, and essential hypertension, which is completely uncontrolled.    She continues to smoke about a quarter of a pack per day, but only when  "she is out of the house. Her  has vascular dementia and macular degeneration, requiring almost total care from her. This limits her ability to be away from home for more than an hour at a time.    She is trying to be careful with her diet. Her fasting glucose increased slightly from 103 to 106, likely due to the stress of caregiving. Her A1c remains in the prediabetic range at 6.4. No changes to her current management plan are being made. She will continue to work on her diet and has recently resumed physical exercise. It is hoped that getting her blood pressure under control may help with her overall health.    For her hypercholesterolemia, she is on atorvastatin 80 mg daily. Her lipid panel shows a total cholesterol of 152, triglycerides of 86, HDL of 63, and LDL of 72. Her kidney function is normal with a GFR of 74, and there is no liver dysfunction.    Her essential hypertension remains uncontrolled, with a current blood pressure of 176/70. She is on lisinopril 20 mg daily. Previous readings have also been high, which has been attributed to her stress at home. In 06/2024, her blood pressure was 174/80, and her weight was essentially unchanged.    Supplemental Information:  She has her 's license due in 12/2024 and needs to get the paperwork done.    Health Maintenance: Completed    Objective:     Exam:     BP (!) 176/70 (BP Location: Left arm, Patient Position: Sitting, BP Cuff Size: Adult)   Pulse 63   Temp 36.2 °C (97.1 °F) (Temporal)   Resp 14   Ht 1.549 m (5' 1\")   Wt 62.1 kg (137 lb)   SpO2 96%   BMI 25.89 kg/m²  Body mass index is 25.89 kg/m².  Physical Exam  Constitutional:       General: He is not in acute distress.     Appearance: Normal appearance. He is normal weight. He is not ill-appearing.     Results  Laboratory Studies: 8/30/24  Fasting glucose increased from 103 to 106. A1c is 6.4. Total cholesterol is 152, triglycerides are 86, HDL is 63, LDL is 72.                ORDERS "     1. Tobacco use disorder, mild, abuse      2. Prediabetes      3. Hypercholesterolemia      4. Essential hypertension                  Please note that this dictation was created using voice recognition software. I have made every reasonable attempt to correct obvious errors, but I expect that there are errors of grammar and possibly content that I did not discover before finalizing the note.

## 2024-11-15 ENCOUNTER — OFFICE VISIT (OUTPATIENT)
Dept: INTERNAL MEDICINE | Facility: IMAGING CENTER | Age: 84
End: 2024-11-15
Payer: MEDICARE

## 2024-11-15 VITALS
RESPIRATION RATE: 14 BRPM | SYSTOLIC BLOOD PRESSURE: 182 MMHG | TEMPERATURE: 98.3 F | HEART RATE: 74 BPM | OXYGEN SATURATION: 95 % | WEIGHT: 138 LBS | HEIGHT: 61 IN | DIASTOLIC BLOOD PRESSURE: 70 MMHG | BODY MASS INDEX: 26.06 KG/M2

## 2024-11-15 DIAGNOSIS — E78.00 HYPERCHOLESTEROLEMIA: ICD-10-CM

## 2024-11-15 DIAGNOSIS — F17.200 TOBACCO USE DISORDER, MILD, ABUSE: ICD-10-CM

## 2024-11-15 DIAGNOSIS — R73.03 PREDIABETES: ICD-10-CM

## 2024-11-15 DIAGNOSIS — I25.10 CORONARY ARTERY DISEASE INVOLVING NATIVE CORONARY ARTERY OF NATIVE HEART WITHOUT ANGINA PECTORIS: ICD-10-CM

## 2024-11-15 DIAGNOSIS — Z00.00 MEDICARE ANNUAL WELLNESS VISIT, SUBSEQUENT: ICD-10-CM

## 2024-11-15 DIAGNOSIS — E89.0 POSTABLATIVE HYPOTHYROIDISM: ICD-10-CM

## 2024-11-15 DIAGNOSIS — I10 ESSENTIAL HYPERTENSION: ICD-10-CM

## 2024-11-15 PROBLEM — T14.8XXA BRUISING: Status: RESOLVED | Noted: 2023-02-17 | Resolved: 2024-11-15

## 2024-11-15 PROCEDURE — G0439 PPPS, SUBSEQ VISIT: HCPCS | Performed by: FAMILY MEDICINE

## 2024-11-15 PROCEDURE — 3078F DIAST BP <80 MM HG: CPT | Performed by: FAMILY MEDICINE

## 2024-11-15 PROCEDURE — 3077F SYST BP >= 140 MM HG: CPT | Performed by: FAMILY MEDICINE

## 2024-11-15 RX ORDER — AMLODIPINE BESYLATE 5 MG/1
5 TABLET ORAL DAILY
Qty: 90 TABLET | Refills: 3 | Status: SHIPPED | OUTPATIENT
Start: 2024-11-15

## 2024-11-15 ASSESSMENT — FIBROSIS 4 INDEX: FIB4 SCORE: 1.7

## 2024-11-15 ASSESSMENT — PATIENT HEALTH QUESTIONNAIRE - PHQ9: CLINICAL INTERPRETATION OF PHQ2 SCORE: 0

## 2024-11-15 ASSESSMENT — ENCOUNTER SYMPTOMS: GENERAL WELL-BEING: EXCELLENT

## 2024-11-15 ASSESSMENT — ACTIVITIES OF DAILY LIVING (ADL): BATHING_REQUIRES_ASSISTANCE: 0

## 2024-11-15 NOTE — PROGRESS NOTES
Verbal consent was acquired by the patient to use [employer] ambient listening note generation during this visit Yes      Chief Complaint   Patient presents with    Annual Exam         HPI:  Pam Adame is a 83 y.o. here for Medicare Annual Wellness Visit     Problem   Bruising (Resolved)       History of Present Illness  The patient is here today for her Medicare subsequent annual wellness exam.    She has several chronic health problems that need to be discussed, including very mild tobacco use, prediabetes, post-ablation hypothyroidism, hypercholesterolemia, essential hypertension, and coronary artery disease involving a native coronary artery treated with a triple bypass.    She underwent a triple bypass surgery 24 years ago. She reports no chest pain, shortness of breath, or dyspnea on exertion.    She visits the [employer] daily, where she smokes 3 to 4 cigarettes. She does not smoke at home, and her  is unaware of her smoking habit.    Her prediabetes is being managed through lifestyle modifications.    Her hypothyroidism is well managed with a daily dose of levothyroxine 100 mcg.    Her hypercholesterolemia is well controlled with a daily dose of atorvastatin 80 mg.    Her essential hypertension is not well controlled. Even with the maximum dose of olmesartan 40 mg daily, her blood pressure remains high. She does not monitor her blood pressure at home.    She is experiencing stress at home due to her 's vascular dementia. She is his primary caregiver, and they live alone. She manages to take short breaks of 1 to 2 hours by visiting the [employer] when her daughter visits.       Patient Active Problem List    Diagnosis Date Noted    Coronary artery disease involving native coronary artery of native heart without angina pectoris 09/14/2017    Postablative hypothyroidism 09/14/2017    Hypercholesterolemia 09/14/2017    Tobacco use disorder, mild, abuse 09/14/2017    Prediabetes 09/14/2017     Essential hypertension 09/14/2017       Current Outpatient Medications   Medication Sig Dispense Refill    amLODIPine (NORVASC) 5 MG Tab Take 1 Tablet by mouth every day. 90 Tablet 3    olmesartan (BENICAR) 40 MG Tab Take 1 Tablet by mouth every day. 90 Tablet 3    levothyroxine (SYNTHROID) 100 MCG Tab TAKE 1 TABLET EVERY MORNING ON AN EMPTY STOMACH 90 Tablet 3    atorvastatin (LIPITOR) 80 MG tablet TAKE 1 TABLET DAILY 90 Tablet 3    aspirin (ASA) 81 MG Chew Tab chewable tablet Take 81 mg by mouth every day.       No current facility-administered medications for this visit.            Current supplements as per medication list.       Allergies: Patient has no known allergies.    Current social contact/activities: goes to [employer] daily and interacts with family     She  reports that she has been smoking cigarettes. She has never used smokeless tobacco. She reports that she does not drink alcohol and does not use drugs.  Ready to quit: Not Answered  Counseling given: Not Answered      DPA/Advanced Directive:  Patient has Living Will on file.     ROS:    Gait: Uses no assistive device  Ostomy: No  Other tubes: No  Amputations: No  Chronic oxygen use: No  Last eye exam: 2017  Wears hearing aids: No   : Denies any urinary leakage during the last 6 months    Screening:    Depression Screening  Little interest or pleasure in doing things?  0 - not at all  Feeling down, depressed , or hopeless? 0 - not at all  Patient Health Questionnaire Score: 0     If depressive symptoms identified deferred to follow up visit unless specifically addressed in assessment and plan.    Interpretation of PHQ-9 Total Score   Score Severity   1-4 No Depression   5-9 Mild Depression   10-14 Moderate Depression   15-19 Moderately Severe Depression   20-27 Severe Depression    Screening for Cognitive Impairment  Do you or any of your friends or family members have any concern about your memory? No  Three Minute Recall (Leader, Season, Table)  /3     Anuj clock face with all 12 numbers and set the hands to show 10 minutes after 11.       Cognitive concerns identified deferred for follow up unless specifically addressed in assessment and plan.    Fall Risk Assessment  Has the patient had two or more falls in the last year or any fall with injury in the last year?  No    Safety Assessment  Do you always wear your seatbelt?  Yes  Any changes to home needed to function safely? No  Difficulty hearing.  No  Patient counseled about all safety risks that were identified.    Functional Assessment ADLs  Are there any barriers preventing you from cooking for yourself or meeting nutritional needs?  No.    Are there any barriers preventing you from driving safely or obtaining transportation?  No.    Are there any barriers preventing you from using a telephone or calling for help?  No    Are there any barriers preventing you from shopping?  No.    Are there any barriers preventing you from taking care of your own finances?  No    Are there any barriers preventing you from managing your medications?  No    Are there any barriers preventing you from showering, bathing or dressing yourself? No    Are there any barriers preventing you from doing housework or laundry? No  Are there any barriers preventing you from using the toilet?No  Are you currently engaging in any exercise or physical activity?  Yes.      Self-Assessment of Health  What is your perception of your health? Excellent    Do you sleep more than six hours a night? No    In the past 7 days, how much did pain keep you from doing your normal work? None    Do you spend quality time with family or friends (virtually or in person)? Yes    Do you usually eat a heart healthy diet that constists of a variety of fruits, vegetables, whole grains and fiber? Yes    Do you eat foods high in fat and/or Fast Food more than three times per week? No    How concerned are you that your medical conditions are not being well managed?  Not at all    Are you worried that in the next 2 months, you may not have stable housing that you own, rent, or stay in as part of a household? Yes      Advance Care Planning  Do you have an Advance Directive, Living Will, Durable Power of , or POLST? Yes                 Health Maintenance Summary            Overdue - Bone Density Scan (Every 5 Years) Never done      No completion history exists for this topic.              Overdue - Zoster (Shingles) Vaccines (1 of 2) Never done      No completion history exists for this topic.              Overdue - COVID-19 Vaccine (3 - 2024-25 season) Overdue since 9/1/2024      03/10/2021  Imm Admin: PFIZER PURPLE CAP SARS-COV-2 VACCINATION (12+)    02/17/2021  Imm Admin: PFIZER PURPLE CAP SARS-COV-2 VACCINATION (12+)              Annual Wellness Visit (Yearly) Next due on 11/15/2025      11/15/2024  Visit Dx: Medicare annual wellness visit, subsequent    04/04/2019  Initial Annual Wellness Visit - Includes PPPS ()    04/04/2019  Visit Dx: Medicare annual wellness visit, subsequent              IMM DTaP/Tdap/Td Vaccine (2 - Td or Tdap) Next due on 2/11/2032 02/11/2022  Imm Admin: Tdap Vaccine              Pneumococcal Vaccine: 65+ Years (Series Information) Completed      01/02/2017  Imm Admin: Pneumococcal polysaccharide vaccine (PPSV-23)    01/01/2016  Imm Admin: Pneumococcal Conjugate Vaccine (Prevnar/PCV-13)              Influenza Vaccine (Series Information) Completed      10/03/2024  Imm Admin: Influenza Vaccine Adult HD    10/18/2023  Imm Admin: Influenza Vaccine Adult HD    10/18/2022  Imm Admin: Influenza Vaccine Adult HD    11/12/2021  Imm Admin: Influenza Vaccine Adult HD    10/08/2020  Imm Admin: Influenza Vaccine Adult HD    Only the first 5 history entries have been loaded, but more history exists.              Hepatitis A Vaccine (Hep A) (Series Information) Aged Out      No completion history exists for this topic.              Hepatitis B  Vaccine (Hep B) (Series Information) Aged Out      No completion history exists for this topic.              HPV Vaccines (Series Information) Aged Out      No completion history exists for this topic.              Polio Vaccine (Inactivated Polio) (Series Information) Aged Out      No completion history exists for this topic.              Meningococcal Immunization (Series Information) Aged Out      No completion history exists for this topic.              Discontinued - Mammogram  Discontinued        Frequency changed to Never automatically (Topic No Longer Applies)    01/15/2018  MA-SCREEN MAMMO W/CAD-BILAT    12/28/2016  [id number]    12/28/2016  MA-SCREENING MAMMO BILAT W/TOMOSYNTHESIS W/CAD    12/02/2015  [id number]    Only the first 5 history entries have been loaded, but more history exists.              Discontinued - Colorectal Cancer Screening  Discontinued        Frequency changed to Never automatically (Topic No Longer Applies)    11/27/2021  COLOGUARD COLON CANCER SCREENING    02/21/2020  Occult Blood Feces Immunoassay (FIT)    01/24/2019  OCCULT BLOOD FECES IMMUNOASSAY (FIT)    09/18/2017  OCCULT BLOOD FECES IMMUNOASSAY    Only the first 5 history entries have been loaded, but more history exists.                    Patient Care Team:  Ryanne Leos M.D. as PCP - General (Family Medicine)        Social History     Tobacco Use    Smoking status: Some Days     Current packs/day: 0.25     Types: Cigarettes    Smokeless tobacco: Never   Vaping Use    Vaping status: Never Used   Substance Use Topics    Alcohol use: No    Drug use: No     Family History   Problem Relation Age of Onset    Cancer Mother         lung    Cancer Father         colon    Lung Cancer Father     Heart Disease Sister     Diabetes Sister      She  has a past medical history of Bruising (2/17/2023), Coronary artery disease involving native coronary artery of native heart without angina pectoris (9/14/2017),  "Essential hypertension (9/14/2017), Hypercholesterolemia (9/14/2017), Hyponatremia (8/18/2023), Postablative hypothyroidism (9/14/2017), Prediabetes (9/14/2017), and Tobacco use disorder, mild, abuse (9/14/2017).   Past Surgical History:   Procedure Laterality Date    CORONARY ARTERY BYPASS, 3  11/2000    HYSTERECTOMY, VAGINAL      TONSILLECTOMY AND ADENOIDECTOMY         Exam:   BP (!) 182/70 (BP Location: Left arm, Patient Position: Sitting, BP Cuff Size: Adult)   Pulse 74   Temp 36.8 °C (98.3 °F) (Temporal)   Resp 14   Ht 1.549 m (5' 1\")   Wt 62.6 kg (138 lb)   SpO2 95%  Body mass index is 26.07 kg/m².    Hearing good.    Dentition good  Alert, oriented in no acute distress.  Eye contact is good, speech goal directed, affect calm    Assessment and Plan. The following treatment and monitoring plan is recommended:    Assessment & Plan  1. Tobacco use.  She smokes three to four cigarettes a day, primarily when visiting the [employer]. She does not smoke at home where her  resides.    2. Prediabetes.  This condition is managed through lifestyle modifications.    3. Post ablation hypothyroidism.  This condition is well controlled with levothyroxine 100 mcg daily.    4. Hypercholesterolemia.  This condition is well controlled with atorvastatin 80 mg daily.    5. Essential hypertension.  Her blood pressure is not well controlled with olmesartan 40 mg daily. Today her blood pressure is 182/70. Amlodipine 5 mg daily has been added to her regimen. The prescription has been sent to her mail order pharmacy and should be available within 3 to 5 days. She is advised to return in 6 weeks to ensure her blood pressure is under control.    6. Coronary artery disease involving native coronary artery.  This condition is stable. She had a triple bypass 24 years ago and is doing well. She reports no chest pain, shortness of breath, or dyspnea on exertion.    7. Stress at home.  She is the primary caregiver for her  who " has vascular dementia. She finds some resp[place] more than 2 hours a day. Her daughter occasionally visits to provide some relief.    Follow-up  Return in 6 weeks for follow up.         Services suggested: No services needed at this time  Health Care Screening: Age-appropriate preventive services recommended by USPTF and ACIP covered by Medicare were discussed today. Services ordered if indicated and agreed upon by the patient.  Referrals offered: Community-based lifestyle interventions to reduce health risks and promote self-management and wellness, fall prevention, nutrition, physical activity, tobacco-use cessation, weight loss, and mental health services as per orders if indicated.    Discussion today about general wellness and lifestyle habits:    Prevent falls and reduce trip hazards; Cautioned about securing or removing rugs.  Have a working fire alarm and carbon monoxide detector;   Engage in regular physical activity and social activities     Follow-up: 6 weeks to fu on BP

## 2025-01-03 ENCOUNTER — OFFICE VISIT (OUTPATIENT)
Dept: INTERNAL MEDICINE | Facility: IMAGING CENTER | Age: 85
End: 2025-01-03
Payer: MEDICARE

## 2025-01-03 VITALS
BODY MASS INDEX: 26.66 KG/M2 | HEIGHT: 61 IN | DIASTOLIC BLOOD PRESSURE: 72 MMHG | TEMPERATURE: 97.4 F | SYSTOLIC BLOOD PRESSURE: 158 MMHG | OXYGEN SATURATION: 95 % | WEIGHT: 141.2 LBS | RESPIRATION RATE: 12 BRPM | HEART RATE: 76 BPM

## 2025-01-03 DIAGNOSIS — I10 ESSENTIAL HYPERTENSION: ICD-10-CM

## 2025-01-03 DIAGNOSIS — I25.10 CORONARY ARTERY DISEASE INVOLVING NATIVE CORONARY ARTERY OF NATIVE HEART WITHOUT ANGINA PECTORIS: ICD-10-CM

## 2025-01-03 DIAGNOSIS — F17.200 TOBACCO USE DISORDER, MILD, ABUSE: ICD-10-CM

## 2025-01-03 PROCEDURE — 3078F DIAST BP <80 MM HG: CPT | Performed by: FAMILY MEDICINE

## 2025-01-03 PROCEDURE — G2211 COMPLEX E/M VISIT ADD ON: HCPCS | Performed by: FAMILY MEDICINE

## 2025-01-03 PROCEDURE — 3077F SYST BP >= 140 MM HG: CPT | Performed by: FAMILY MEDICINE

## 2025-01-03 PROCEDURE — 99214 OFFICE O/P EST MOD 30 MIN: CPT | Performed by: FAMILY MEDICINE

## 2025-01-03 RX ORDER — AMLODIPINE BESYLATE 10 MG/1
10 TABLET ORAL DAILY
Qty: 90 TABLET | Refills: 3 | Status: SHIPPED | OUTPATIENT
Start: 2025-01-03

## 2025-01-03 ASSESSMENT — FIBROSIS 4 INDEX: FIB4 SCORE: 1.72

## 2025-01-03 ASSESSMENT — PATIENT HEALTH QUESTIONNAIRE - PHQ9: CLINICAL INTERPRETATION OF PHQ2 SCORE: 0

## 2025-01-10 NOTE — PROGRESS NOTES
Verbal consent was acquired by the patient to use [employer] ambient listening note generation during this visit yes    Assessment & Plan:  Assessment & Plan  1. Essential hypertension, poorly controlled.  Continue olmesartan 40 mg daily, add in amlodipine 10 mg daily. She is advised to check her blood pressure a couple of times a week to ensure it is heading in the right direction.    2. Coronary artery disease involving a native coronary artery without angina pectoris.  This is a chronic health problem for her. She is stable. A referral to cardiology will not be made unless she starts developing symptoms.    3. Tobacco use disorder.  This is a chronic health problem, and it is mild. She has been warned that this increases her risk of developing cardiovascular disease and is advised to try to quit.    Follow-up  The patient will follow up in 6 weeks.    Billing : secondary to the complexity of this patient's illnesses and their interactions.  All problems listed were discussed during the office visit, medications were evaluated and complexities were discussed as well as plan for the future.       Subjective:     HPI:   History of Present Illness  The patient presents today for a follow-up regarding her essential hypertension, which has not been well controlled, as well as to discuss coronary artery disease involving the native coronary artery without angina pectoris, and tobacco use disorder.    She saw me on 11/15/2024, and at that time, her blood pressure was elevated at 182/70. She did not have time to do the blood pressure checks, and she presents here today with her blood pressure better, down to 158/72, but it is not at goal. She has not had any ankle swelling, and she is in absolute agreement with doing that. We should be able to get her blood pressure less than 140/90 with that addition. It was recommended to add amlodipine 5 mg daily to her olmesartan 40 mg daily and have her do some blood pressure  "checks to ensure that her blood pressure is under control. It was recommended to increase her amlodipine now to the 10 mg dose.    She has had a heart attack in the past. She does not have angina pectoris but is not currently seeing a cardiologist. It has been 25 years since she had her heart attack and has had triple bypass. She states that she does not want to see a cardiologist unless she starts to develop some type of cardiologic symptomatology and therefore is good with just staying at following with me.    She continues to smoke and is under a great deal of stress. Her  has vascular dementia, and he is starting to show some progression. The only time she is out of the home is the 1 to 2 hours every day that she goes to the [employer]. She does smoke there typically less than 10 cigarettes. She is not trying to quit smoking and states that she just does not see herself being able to do that in the long run. I understand that decision.    SOCIAL HISTORY  The patient continues to smoke, typically less than 10 cigarettes a day, and is not trying to quit. She visits the [employer] for 1 to 2 hours every day.        Health Maintenance: Completed    Objective:     Exam:     BP (!) 158/72 (BP Location: Left arm, Patient Position: Sitting, BP Cuff Size: Adult)   Pulse 76   Temp 36.3 °C (97.4 °F) (Temporal)   Resp 12   Ht 1.549 m (5' 1\")   Wt 64 kg (141 lb 3.2 oz)   SpO2 95%   BMI 26.68 kg/m²  Body mass index is 26.68 kg/m².  Physical Exam  Constitutional:       General: He is not in acute distress.     Appearance: Normal appearance. He is normal weight. He is not ill-appearing.   al.         Behavior: Behavior normal.      Results                  ORDERS     1. Essential hypertension      2. Coronary artery disease involving native coronary artery of native heart without angina pectoris      3. Tobacco use disorder, mild, abuse                  Please note that this dictation was created using voice recognition " software. I have made every reasonable attempt to correct obvious errors, but I expect that there are errors of grammar and possibly content that I did not discover before finalizing the note.

## 2025-02-07 ENCOUNTER — OFFICE VISIT (OUTPATIENT)
Dept: INTERNAL MEDICINE | Facility: IMAGING CENTER | Age: 85
End: 2025-02-07
Payer: MEDICARE

## 2025-02-07 VITALS
DIASTOLIC BLOOD PRESSURE: 70 MMHG | OXYGEN SATURATION: 96 % | SYSTOLIC BLOOD PRESSURE: 160 MMHG | HEIGHT: 61 IN | WEIGHT: 143 LBS | RESPIRATION RATE: 14 BRPM | HEART RATE: 68 BPM | BODY MASS INDEX: 27 KG/M2 | TEMPERATURE: 98.3 F

## 2025-02-07 DIAGNOSIS — M25.472 ANKLE EDEMA, BILATERAL: ICD-10-CM

## 2025-02-07 DIAGNOSIS — I10 ESSENTIAL HYPERTENSION: ICD-10-CM

## 2025-02-07 DIAGNOSIS — F17.200 TOBACCO USE DISORDER, MILD, ABUSE: ICD-10-CM

## 2025-02-07 DIAGNOSIS — M25.471 ANKLE EDEMA, BILATERAL: ICD-10-CM

## 2025-02-07 PROCEDURE — 3077F SYST BP >= 140 MM HG: CPT | Performed by: FAMILY MEDICINE

## 2025-02-07 PROCEDURE — 3078F DIAST BP <80 MM HG: CPT | Performed by: FAMILY MEDICINE

## 2025-02-07 PROCEDURE — 99214 OFFICE O/P EST MOD 30 MIN: CPT | Performed by: FAMILY MEDICINE

## 2025-02-07 RX ORDER — SPIRONOLACTONE 25 MG/1
25 TABLET ORAL DAILY
Qty: 90 TABLET | Refills: 0 | Status: SHIPPED | OUTPATIENT
Start: 2025-02-07

## 2025-02-07 ASSESSMENT — FIBROSIS 4 INDEX: FIB4 SCORE: 1.72

## 2025-02-07 NOTE — PROGRESS NOTES
Verbal consent was acquired by the patient to use [employer] ambient listening note generation during this visit yes    Assessment & Plan:  Assessment & Plan  1. Essential hypertension.  Her hypertension is a chronic health issue, currently borderline controlled with her existing medication regimen. The plan is to maintain her on olmesartan 40 mg daily, discontinue amlodipine due to the side effect of ankle swelling, and initiate spironolactone 25 mg daily. She will  the new medication today and should see improvement within 10 days. Blood work will be conducted prior to the next visit.    2. Bilateral ankle edema.  The ankle edema is likely a side effect of amlodipine. Amlodipine will be discontinued.    3. Tobacco use disorder.  This is a mild chronic health issue. She is currently unwilling and unable to quit smoking, particularly during her visits to the [facility].    4. Stress management.  She reports significant stress due to caring for her , who has vascular dementia and multiple chronic health problems. A palliative care referral has been placed for her , which may help alleviate some of her stress.    Follow-up  The patient will follow up in 1 month.        Subjective:     HPI:   History of Present Illness  The patient presents for a follow-up on her hypertension.    She is a long-term patient who has experienced escalating blood pressure issues over the past year. During her last visit a month ago, she was on olmesartan 40 mg and amlodipine 5 mg, yet her systolic blood pressure remained in the 180s. Consequently, her amlodipine dosage was increased to 10 mg. Today, her blood pressure has improved slightly to 160/70. She does not monitor her blood pressure at home. She reports no chest pain, shortness of breath, or dyspnea on exertion.    She has developed severe bilateral ankle edema, which she initially attributed to excessive salt intake, unaware that it could be a side effect of her  "medication. She did not contact me to inquire about this possibility.    She is under significant stress due to her 's vascular dementia and multiple chronic health conditions. She spends approximately 2 hours daily outside the home, [place] a [place] where she smokes. She does not smoke indoors and maintains that she can not quit smoking at the casino.    SOCIAL HISTORY  She smokes [place] for about 2 hours a day but does not smoke at home.        Health Maintenance: Completed    Objective:     Exam:     BP (!) 160/70 (BP Location: Left arm, Patient Position: Sitting, BP Cuff Size: Adult)   Pulse 68   Temp 36.8 °C (98.3 °F) (Temporal)   Resp 14   Ht 1.549 m (5' 1\")   Wt 64.9 kg (143 lb)   SpO2 96%   BMI 27.02 kg/m²  Body mass index is 27.02 kg/m².  Physical Exam  Constitutional:       General: He is not in acute distress.     Appearance: ankles are swollen bilaterally.  Results                  ORDERS     1. Essential hypertension      2. Tobacco use disorder, mild, abuse                  Please note that this dictation was created using voice recognition software. I have made every reasonable attempt to correct obvious errors, but I expect that there are errors of grammar and possibly content that I did not discover before finalizing the note.        "

## 2025-02-25 ENCOUNTER — PATIENT MESSAGE (OUTPATIENT)
Dept: INTERNAL MEDICINE | Facility: IMAGING CENTER | Age: 85
End: 2025-02-25
Payer: MEDICARE

## 2025-03-04 ENCOUNTER — HOSPITAL ENCOUNTER (OUTPATIENT)
Dept: LAB | Facility: MEDICAL CENTER | Age: 85
End: 2025-03-04
Attending: FAMILY MEDICINE
Payer: MEDICARE

## 2025-03-04 DIAGNOSIS — I10 ESSENTIAL HYPERTENSION: ICD-10-CM

## 2025-03-04 LAB
ALBUMIN SERPL BCP-MCNC: 4.1 G/DL (ref 3.2–4.9)
ALBUMIN/GLOB SERPL: 1.4 G/DL
ALP SERPL-CCNC: 111 U/L (ref 30–99)
ALT SERPL-CCNC: 16 U/L (ref 2–50)
ANION GAP SERPL CALC-SCNC: 10 MMOL/L (ref 7–16)
AST SERPL-CCNC: 22 U/L (ref 12–45)
BILIRUB SERPL-MCNC: 0.7 MG/DL (ref 0.1–1.5)
BUN SERPL-MCNC: 17 MG/DL (ref 8–22)
CALCIUM ALBUM COR SERPL-MCNC: 9.6 MG/DL (ref 8.5–10.5)
CALCIUM SERPL-MCNC: 9.7 MG/DL (ref 8.5–10.5)
CHLORIDE SERPL-SCNC: 108 MMOL/L (ref 96–112)
CO2 SERPL-SCNC: 23 MMOL/L (ref 20–33)
CREAT SERPL-MCNC: 0.87 MG/DL (ref 0.5–1.4)
GFR SERPLBLD CREATININE-BSD FMLA CKD-EPI: 66 ML/MIN/1.73 M 2
GLOBULIN SER CALC-MCNC: 3 G/DL (ref 1.9–3.5)
GLUCOSE SERPL-MCNC: 105 MG/DL (ref 65–99)
POTASSIUM SERPL-SCNC: 4.9 MMOL/L (ref 3.6–5.5)
PROT SERPL-MCNC: 7.1 G/DL (ref 6–8.2)
SODIUM SERPL-SCNC: 141 MMOL/L (ref 135–145)

## 2025-03-04 PROCEDURE — 36415 COLL VENOUS BLD VENIPUNCTURE: CPT

## 2025-03-04 PROCEDURE — 80053 COMPREHEN METABOLIC PANEL: CPT

## 2025-03-07 ENCOUNTER — OFFICE VISIT (OUTPATIENT)
Dept: INTERNAL MEDICINE | Facility: IMAGING CENTER | Age: 85
End: 2025-03-07
Payer: MEDICARE

## 2025-03-07 VITALS
RESPIRATION RATE: 14 BRPM | TEMPERATURE: 97.9 F | DIASTOLIC BLOOD PRESSURE: 70 MMHG | WEIGHT: 138 LBS | HEART RATE: 73 BPM | BODY MASS INDEX: 26.07 KG/M2 | OXYGEN SATURATION: 94 % | SYSTOLIC BLOOD PRESSURE: 160 MMHG

## 2025-03-07 DIAGNOSIS — R73.03 PREDIABETES: ICD-10-CM

## 2025-03-07 DIAGNOSIS — I10 ESSENTIAL HYPERTENSION: ICD-10-CM

## 2025-03-07 PROCEDURE — G2211 COMPLEX E/M VISIT ADD ON: HCPCS | Performed by: FAMILY MEDICINE

## 2025-03-07 PROCEDURE — 3078F DIAST BP <80 MM HG: CPT | Performed by: FAMILY MEDICINE

## 2025-03-07 PROCEDURE — 99214 OFFICE O/P EST MOD 30 MIN: CPT | Performed by: FAMILY MEDICINE

## 2025-03-07 PROCEDURE — 3077F SYST BP >= 140 MM HG: CPT | Performed by: FAMILY MEDICINE

## 2025-03-07 RX ORDER — SPIRONOLACTONE 25 MG/1
25 TABLET ORAL 2 TIMES DAILY
Qty: 180 TABLET | Refills: 3 | Status: SHIPPED | OUTPATIENT
Start: 2025-03-07

## 2025-03-07 ASSESSMENT — FIBROSIS 4 INDEX: FIB4 SCORE: 1.7

## 2025-03-07 NOTE — PROGRESS NOTES
Verbal consent was acquired by the patient to use [employer] ambient listening note generation during this visit yes    Assessment & Plan:  Assessment & Plan  1. Essential hypertension.  This is a chronic health problem, currently borderline controlled. She is on olmesartan 40 mg daily and spironolactone 25 mg daily. Her blood pressure has decreased from the 190s to 160/70. She could not tolerate the ankle edema that developed with the higher dose of amlodipine. The patient will continue olmesartan 40 mg daily but increase her spironolactone to 25 mg twice daily. She has been asked to contact her mail-order pharmacy to ensure they are aware of the increased dose.    2. Prediabetes.  This is a chronic health problem, adequately managed with lifestyle modifications. Her blood sugar has decreased to 105. She will continue with her current lifestyle management. An A1c test is planned for her in about 3 months.    Follow-up  The patient will follow up in approximately 6 weeks with blood work prior to the visit.        Subjective:     HPI:   History of Present Illness  The patient is here today for follow-up on her hypertension and prediabetes. She also did blood work prior to the visit on 03/04/2025.    She has had uncontrolled hypertension, and we have been adjusting medications to try and get this under control. Initially, she was on amlodipine 5 mg daily, which she tolerated well. However, an increase to 10 mg daily was necessary due to her blood pressure readings in the 190s. Unfortunately, she could not tolerate the ankle edema that developed with the higher dose. Consequently, spironolactone 25 mg daily was added to her regimen. To ensure she does not develop hyperkalemia, blood work was conducted. Her blood pressure has improved from the 190s to 160/70. She is currently on olmesartan at the maximum dose of 40 mg daily and spironolactone 25 mg daily.    She has prediabetes and has been working on reducing  carbohydrate intake to lower her blood sugar levels. Her blood sugar has decreased to 105.    Billing : secondary to the complexity of this patient's illnesses and their interactions.  All problems listed were discussed during the office visit, medications were evaluated and complexities were discussed as well as plan for the future.       Health Maintenance: Completed    Objective:     Exam:     BP (!) 160/70 (BP Location: Left arm, Patient Position: Sitting, BP Cuff Size: Adult)   Pulse 73   Temp 36.6 °C (97.9 °F) (Temporal)   Resp 14   Wt 62.6 kg (138 lb)   SpO2 94%   BMI 26.07 kg/m²  Body mass index is 26.07 kg/m².  Physical Exam  Constitutional:       General: She is not in acute distress.     Appearance: Normal appearance. She is normal weight. She is not ill-appearing.   Cardiovascular:      Rate and Rhythm: Normal rate.      Pulses: Normal pulses.      Heart sounds: Normal heart sounds. No murmur heard.     No friction rub. No gallop.       Results  Laboratory Studies: 3/4/25  Potassium level is in the normal range at 4.9. Blood sugar is 105. Alkaline phosphatase is slightly elevated at 111. GFR dropped slightly from 74 to 66.                ORDERS     1. Essential hypertension      2. Prediabetes                  Please note that this dictation was created using voice recognition software. I have made every reasonable attempt to correct obvious errors, but I expect that there are errors of grammar and possibly content that I did not discover before finalizing the note.

## 2025-04-06 DIAGNOSIS — I10 ESSENTIAL HYPERTENSION: ICD-10-CM

## 2025-04-07 RX ORDER — ATORVASTATIN CALCIUM 80 MG/1
80 TABLET, FILM COATED ORAL DAILY
Qty: 90 TABLET | Refills: 3 | Status: SHIPPED | OUTPATIENT
Start: 2025-04-07

## 2025-04-21 RX ORDER — SPIRONOLACTONE 25 MG/1
25 TABLET ORAL 2 TIMES DAILY
Qty: 180 TABLET | Refills: 3 | Status: SHIPPED | OUTPATIENT
Start: 2025-04-21

## 2025-04-25 ENCOUNTER — OFFICE VISIT (OUTPATIENT)
Dept: INTERNAL MEDICINE | Facility: IMAGING CENTER | Age: 85
End: 2025-04-25
Payer: MEDICARE

## 2025-04-25 VITALS
WEIGHT: 137 LBS | BODY MASS INDEX: 25.86 KG/M2 | DIASTOLIC BLOOD PRESSURE: 70 MMHG | SYSTOLIC BLOOD PRESSURE: 136 MMHG | HEART RATE: 68 BPM | HEIGHT: 61 IN | OXYGEN SATURATION: 96 % | TEMPERATURE: 96.9 F | RESPIRATION RATE: 14 BRPM

## 2025-04-25 DIAGNOSIS — R73.03 PREDIABETES: ICD-10-CM

## 2025-04-25 DIAGNOSIS — I10 ESSENTIAL HYPERTENSION: ICD-10-CM

## 2025-04-25 DIAGNOSIS — E78.00 HYPERCHOLESTEROLEMIA: ICD-10-CM

## 2025-04-25 DIAGNOSIS — E89.0 POSTABLATIVE HYPOTHYROIDISM: ICD-10-CM

## 2025-04-25 DIAGNOSIS — F17.200 TOBACCO USE DISORDER, MILD, ABUSE: ICD-10-CM

## 2025-04-25 PROCEDURE — 3078F DIAST BP <80 MM HG: CPT | Performed by: FAMILY MEDICINE

## 2025-04-25 PROCEDURE — 3075F SYST BP GE 130 - 139MM HG: CPT | Performed by: FAMILY MEDICINE

## 2025-04-25 PROCEDURE — 99213 OFFICE O/P EST LOW 20 MIN: CPT | Performed by: FAMILY MEDICINE

## 2025-04-25 RX ORDER — LEVOTHYROXINE SODIUM 100 UG/1
TABLET ORAL
Qty: 90 TABLET | Refills: 3 | Status: SHIPPED | OUTPATIENT
Start: 2025-04-25

## 2025-04-25 ASSESSMENT — FIBROSIS 4 INDEX: FIB4 SCORE: 1.7

## 2025-04-25 NOTE — PROGRESS NOTES
Verbal consent was acquired by the patient to use [employer] ambient listening note generation during this visit yes    Assessment & Plan:  Assessment & Plan  1. Essential hypertension  - Blood pressure initially measured at 142/74, decreased to 136/70 after a brief discussion.  Treatment plan: No adverse side effects reported from olmesartan 40 mg daily and spironolactone 25 mg twice a day. Adequate control achieved with current medications.  Follow-up: Recheck scheduled in 3 months.    2. Hypercholesterolemia  - Chronic health problem, currently well controlled.  Diagnostic plan: No recent lab work conducted. Laboratory work will be ordered prior to the next visit.  Follow-up: Follow-up scheduled in 3 months.    3. Prediabetes  - Chronic health problem, currently well controlled.  Diagnostic plan: No recent lab work conducted. Laboratory work will be ordered prior to the next visit.  Follow-up: Follow-up scheduled in 3 months.    4. Tobacco use disorder  - Mild, chronic problem, currently uncontrolled. Patient continues to smoke when visiting [facility]. Not interested in quitting at this time.  Follow-up: Follow-up scheduled in 3 months.    5. Post-ablative hypothyroidism  - Chronic health problem, currently well controlled on levothyroxine 100 mcg daily.  Diagnostic plan: No recent lab work conducted. Laboratory work will be ordered prior to the next visit.  Follow-up: Follow-up scheduled in 3 months.    Billing : secondary to the complexity of this patient's illnesses and their interactions.  All problems listed were discussed during the office visit, medications were evaluated and complexities were discussed as well as plan for the future.       Subjective:     HPI:   History of Present Illness  The patient is here today mainly to follow up on her chronic health problems, which include hypertension, hypercholesterolemia, prediabetes, and hypothyroidism. No labs were done before today's visit to ensure her  "blood pressure was under adequate control. She reports no adverse side effects from olmesartan 40 mg daily and spironolactone 25 mg twice a day. Her blood pressure initially measured 142/74, and after a few minutes of conversation, it was rechecked and recorded at 136/70, indicating adequate control. The importance of continuing her medications was discussed, and no adverse side effects from the medications were reported.    Hypertension  - Onset: Chronic condition  - Location: Systemic  - Duration: Ongoing  - Character: Blood pressure initially measured 142/74, rechecked at 136/70  - Alleviating/Aggravating Factors: No adverse side effects from olmesartan 40 mg daily and spironolactone 25 mg twice a day  - Timing: Blood pressure rechecked after a few minutes of conversation  - Severity: Adequate control indicated by blood pressure readings    Smoking and Stress  Smoking continues when visiting [employer], providing her with 1 to 2 hours away from her , who has vascular dementia. His increasing sedentary behavior is stressful for her, and she worries about him most of the time.  - Onset: Ongoing  - Duration: 1 to 2 hours when visiting [facility]  - Character: Smoking as a coping mechanism for stress  - Alleviating/Aggravating Factors: Provides temporary relief from stress related to 's health  - Severity: Significant stress due to 's increasing sedentary behavior    Blood work is needed before the next visit, which is scheduled in 3 months.    SOCIAL HISTORY  The patient continues to smoke when she goes to the [employer] and she does that to get one or two hours away from her  who has vascular dementia. He has become more and more sedentary and this is quite stressful for her and she finds herself worrying about him most of the time.    Health Maintenance: Completed    Objective:     Exam:     /70   Pulse 68   Temp 36.1 °C (96.9 °F)   Resp 14   Ht 1.549 m (5' 0.98\")   Wt 62.1 kg (137 " lb)   SpO2 96%   BMI 25.90 kg/m²  Body mass index is 25.9 kg/m².  Physical Exam  Constitutional:       General: She is not in acute distress.     Appearance: Normal appearance. she is normal weight. she is not ill-appearing.        Results                  ORDERS     1. Essential hypertension      2. Hypercholesterolemia    - CBC WITHOUT DIFFERENTIAL; Future  - Lipid Profile; Future  - Comp Metabolic Panel; Future    3. Prediabetes    - HEMOGLOBIN A1C; Future    4. Tobacco use disorder, mild, abuse      5. Postablative hypothyroidism    - levothyroxine (SYNTHROID) 100 MCG Tab; TAKE 1 TABLET EVERY MORNING ON AN EMPTY STOMACH  Dispense: 90 Tablet; Refill: 3  - TSH WITH REFLEX TO FT4; Future                Please note that this dictation was created using voice recognition software. I have made every reasonable attempt to correct obvious errors, but I expect that there are errors of grammar and possibly content that I did not discover before finalizing the note.

## 2025-07-03 ENCOUNTER — HOSPITAL ENCOUNTER (OUTPATIENT)
Dept: LAB | Facility: MEDICAL CENTER | Age: 85
End: 2025-07-03
Attending: FAMILY MEDICINE
Payer: MEDICARE

## 2025-07-03 DIAGNOSIS — E89.0 POSTABLATIVE HYPOTHYROIDISM: ICD-10-CM

## 2025-07-03 DIAGNOSIS — R73.03 PREDIABETES: ICD-10-CM

## 2025-07-03 DIAGNOSIS — E78.00 HYPERCHOLESTEROLEMIA: ICD-10-CM

## 2025-07-03 LAB
ALBUMIN SERPL BCP-MCNC: 4.3 G/DL (ref 3.2–4.9)
ALBUMIN/GLOB SERPL: 1.7 G/DL
ALP SERPL-CCNC: 113 U/L (ref 30–99)
ALT SERPL-CCNC: 21 U/L (ref 2–50)
ANION GAP SERPL CALC-SCNC: 11 MMOL/L (ref 7–16)
AST SERPL-CCNC: 21 U/L (ref 12–45)
BILIRUB SERPL-MCNC: 0.7 MG/DL (ref 0.1–1.5)
BUN SERPL-MCNC: 24 MG/DL (ref 8–22)
CALCIUM ALBUM COR SERPL-MCNC: 9.8 MG/DL (ref 8.5–10.5)
CALCIUM SERPL-MCNC: 10 MG/DL (ref 8.5–10.5)
CHLORIDE SERPL-SCNC: 107 MMOL/L (ref 96–112)
CHOLEST SERPL-MCNC: 134 MG/DL (ref 100–199)
CO2 SERPL-SCNC: 21 MMOL/L (ref 20–33)
CREAT SERPL-MCNC: 0.89 MG/DL (ref 0.5–1.4)
ERYTHROCYTE [DISTWIDTH] IN BLOOD BY AUTOMATED COUNT: 49.6 FL (ref 35.9–50)
EST. AVERAGE GLUCOSE BLD GHB EST-MCNC: 137 MG/DL
GFR SERPLBLD CREATININE-BSD FMLA CKD-EPI: 64 ML/MIN/1.73 M 2
GLOBULIN SER CALC-MCNC: 2.5 G/DL (ref 1.9–3.5)
GLUCOSE SERPL-MCNC: 105 MG/DL (ref 65–99)
HBA1C MFR BLD: 6.4 % (ref 4–5.6)
HCT VFR BLD AUTO: 45 % (ref 37–47)
HDLC SERPL-MCNC: 53 MG/DL
HGB BLD-MCNC: 14.6 G/DL (ref 12–16)
LDLC SERPL CALC-MCNC: 65 MG/DL
MCH RBC QN AUTO: 30.4 PG (ref 27–33)
MCHC RBC AUTO-ENTMCNC: 32.4 G/DL (ref 32.2–35.5)
MCV RBC AUTO: 93.8 FL (ref 81.4–97.8)
PLATELET # BLD AUTO: 238 K/UL (ref 164–446)
PMV BLD AUTO: 9.1 FL (ref 9–12.9)
POTASSIUM SERPL-SCNC: 5.2 MMOL/L (ref 3.6–5.5)
PROT SERPL-MCNC: 6.8 G/DL (ref 6–8.2)
RBC # BLD AUTO: 4.8 M/UL (ref 4.2–5.4)
SODIUM SERPL-SCNC: 139 MMOL/L (ref 135–145)
TRIGL SERPL-MCNC: 81 MG/DL (ref 0–149)
TSH SERPL DL<=0.005 MIU/L-ACNC: 0.45 UIU/ML (ref 0.38–5.33)
WBC # BLD AUTO: 7 K/UL (ref 4.8–10.8)

## 2025-07-03 PROCEDURE — 84443 ASSAY THYROID STIM HORMONE: CPT

## 2025-07-03 PROCEDURE — 80061 LIPID PANEL: CPT

## 2025-07-03 PROCEDURE — 80053 COMPREHEN METABOLIC PANEL: CPT

## 2025-07-03 PROCEDURE — 85027 COMPLETE CBC AUTOMATED: CPT

## 2025-07-03 PROCEDURE — 83036 HEMOGLOBIN GLYCOSYLATED A1C: CPT | Mod: GA

## 2025-07-03 PROCEDURE — 36415 COLL VENOUS BLD VENIPUNCTURE: CPT

## 2025-07-11 ENCOUNTER — OFFICE VISIT (OUTPATIENT)
Dept: INTERNAL MEDICINE | Facility: IMAGING CENTER | Age: 85
End: 2025-07-11
Payer: MEDICARE

## 2025-07-11 VITALS
RESPIRATION RATE: 18 BRPM | DIASTOLIC BLOOD PRESSURE: 60 MMHG | TEMPERATURE: 97.4 F | OXYGEN SATURATION: 95 % | WEIGHT: 133.2 LBS | HEART RATE: 64 BPM | HEIGHT: 61 IN | SYSTOLIC BLOOD PRESSURE: 130 MMHG | BODY MASS INDEX: 25.15 KG/M2

## 2025-07-11 DIAGNOSIS — I10 ESSENTIAL HYPERTENSION: ICD-10-CM

## 2025-07-11 DIAGNOSIS — R73.03 PREDIABETES: ICD-10-CM

## 2025-07-11 DIAGNOSIS — E89.0 POSTABLATIVE HYPOTHYROIDISM: Primary | ICD-10-CM

## 2025-07-11 DIAGNOSIS — E78.00 HYPERCHOLESTEROLEMIA: ICD-10-CM

## 2025-07-11 PROCEDURE — 99214 OFFICE O/P EST MOD 30 MIN: CPT | Performed by: FAMILY MEDICINE

## 2025-07-11 PROCEDURE — 3078F DIAST BP <80 MM HG: CPT | Performed by: FAMILY MEDICINE

## 2025-07-11 PROCEDURE — G2211 COMPLEX E/M VISIT ADD ON: HCPCS | Performed by: FAMILY MEDICINE

## 2025-07-11 PROCEDURE — 3075F SYST BP GE 130 - 139MM HG: CPT | Performed by: FAMILY MEDICINE

## 2025-07-11 ASSESSMENT — FIBROSIS 4 INDEX: FIB4 SCORE: 1.58

## 2025-07-11 NOTE — PROGRESS NOTES
Verbal consent was acquired by the patient to use [employer] ambient listening note generation during this visit yes    Assessment & Plan:  Assessment & Plan  Hypothyroidism  TSH is within normal range at 0.453. No reported issues with fatigue.  Treatment plan: Continue levothyroxine 100 mcg daily. Plan to monitor thyroid function tests periodically.    Hypercholesterolemia  Cholesterol is 134, triglycerides are 81, HDL is 53, LDL is 65. Lipid levels are within target range for coronary artery disease.  Treatment plan: Continue atorvastatin 80 mg daily. Plan to recheck lipid panel in 3 months.    Prediabetes  Fasting blood sugar is 105, A1c is 6.4. Condition remains in the prediabetic category.  Treatment plan: Advised to exercise caution with diet, particularly avoiding sweets. Plan to monitor blood glucose and A1c in 3 months.    Essential hypertension  Blood pressure today is 130/60. Previous issues with calcium channel blocker causing ankle swelling, now resolved.  Treatment plan: Continue olmesartan 40 mg daily and spironolactone 25 mg twice a day. Plan to monitor blood pressure regularly.    Follow-up: The patient will follow up in 3 months.    Billing : secondary to the complexity of this patient's illnesses and their interactions.  See assessment and plan above for the comprehensive evaluation and management of the patient's acute and chronic concerns.  As the patient's PCP, I am the continued focal point for all health care services for the patient's needs and ongoing subsequent medical care.  All problems listed were discussed during the office visit, medications were evaluated and complexities were discussed, as well as plans for the future care of this patient.     Subjective:     HPI:   History of Present Illness  The patient presents today for follow-up on her chronic health problems and to review recent lab results.    Postablative Hypothyroidism  She has postablative hypothyroidism and has been  on levothyroxine 100 mcg daily for a long time. She reports no issues with fatigue, attributing her good health to a consistent sleep schedule of 8 to 9 hours per night. She does not experience chest pain but notes occasional shortness of breath during physical activity, which she manages by resting. She has received radioactive iodine twice.  - Medication: Levothyroxine 100 mcg daily.  - Alleviating Factors: Consistent sleep schedule of 8 to 9 hours per night.  - Radiation: Received radioactive iodine twice.  - Character: Occasional shortness of breath during physical activity.  - Alleviating Factors: Manages shortness of breath by resting.    Hypercholesterolemia  She has hypercholesterolemia and is currently on atorvastatin 80 mg daily. Her cholesterol levels are well-managed with this medication.  - Medication: Atorvastatin 80 mg daily.  - Severity: Cholesterol levels are well-managed.    Prediabetes  She has prediabetes, a condition she has been managing for an extended period. Her fasting blood sugar was 105, and her A1c is 6.4. She enjoys baked goods prepared by her daughter, who is also a type 2 diabetic.  - Duration: Managing for an extended period.  - Character: Fasting blood sugar was 105, and A1c is 6.4.  - Alleviating/Aggravating Factors: Enjoys baked goods prepared by her daughter.    Essential Hypertension  She has essential hypertension. She experienced severe ankle swelling when previously tried on a calcium channel blocker, but this issue has since resolved. She is currently on olmesartan 40 mg daily and spironolactone 25 mg twice a day, which have successfully controlled her blood pressure.  - Medication: Olmesartan 40 mg daily and spironolactone 25 mg twice a day.  - Character: Severe ankle swelling when previously tried on a calcium channel blocker.  - Alleviating Factors: Issue resolved with current medication.  - Severity: Blood pressure successfully controlled.    Osteoarthritis  She has  "declined a bone density test, citing her age and lack of history of bone fractures. She does have osteoarthritis.    FAMILY HISTORY  Her daughter is a type II diabetic.  Pt refuses a DEXA because she has not ever broken a bone  Current Medications[1]     Health Maintenance: Completed    Objective:     Exam:     /60 (BP Location: Left arm, Patient Position: Sitting, BP Cuff Size: Adult)   Pulse 64   Temp 36.3 °C (97.4 °F) (Temporal)   Resp 18   Ht 1.549 m (5' 1\")   Wt 60.4 kg (133 lb 3.2 oz)   SpO2 95%   BMI 25.17 kg/m²  Body mass index is 25.17 kg/m².  Physical Exam  Constitutional:       General: she is not in acute distress.     Appearance: Normal appearance. she is normal weight. she is not ill-appearing.     Results  Labs: 7/3/25   - TSH: 0.453   - Cholesterol: 134   - Triglycerides: 81   - HDL: 53   - LDL: 65   - Fasting blood sugar: 105   - A1c: 6.4                ORDERS     1. Postablative hypothyroidism (Primary)      2. Hypercholesterolemia      3. Prediabetes      4. Essential hypertension                  Please note that this dictation was created using voice recognition software. I have made every reasonable attempt to correct obvious errors, but I expect that there are errors of grammar and possibly content that I did not discover before finalizing the note.             [1]   Current Outpatient Medications:     levothyroxine (SYNTHROID) 100 MCG Tab, TAKE 1 TABLET EVERY MORNING ON AN EMPTY STOMACH, Disp: 90 Tablet, Rfl: 3    spironolactone (ALDACTONE) 25 MG Tab, Take 1 Tablet by mouth 2 times a day., Disp: 180 Tablet, Rfl: 3    atorvastatin (LIPITOR) 80 MG tablet, TAKE 1 TABLET DAILY, Disp: 90 Tablet, Rfl: 3    olmesartan (BENICAR) 40 MG Tab, Take 1 Tablet by mouth every day., Disp: 90 Tablet, Rfl: 3    aspirin (ASA) 81 MG Chew Tab chewable tablet, Take 81 mg by mouth every day., Disp: , Rfl:     "

## 2025-07-17 RX ORDER — OLMESARTAN MEDOXOMIL 40 MG/1
40 TABLET ORAL DAILY
Qty: 90 TABLET | Refills: 3 | Status: SHIPPED | OUTPATIENT
Start: 2025-07-17

## 2025-07-29 ENCOUNTER — PATIENT MESSAGE (OUTPATIENT)
Dept: INTERNAL MEDICINE | Facility: IMAGING CENTER | Age: 85
End: 2025-07-29
Payer: MEDICARE

## 2025-07-30 ENCOUNTER — PATIENT MESSAGE (OUTPATIENT)
Dept: INTERNAL MEDICINE | Facility: IMAGING CENTER | Age: 85
End: 2025-07-30
Payer: MEDICARE

## 2025-08-05 ENCOUNTER — PATIENT MESSAGE (OUTPATIENT)
Dept: INTERNAL MEDICINE | Facility: IMAGING CENTER | Age: 85
End: 2025-08-05
Payer: MEDICARE

## 2025-08-19 ENCOUNTER — PATIENT MESSAGE (OUTPATIENT)
Dept: INTERNAL MEDICINE | Facility: IMAGING CENTER | Age: 85
End: 2025-08-19
Payer: MEDICARE